# Patient Record
Sex: MALE | Race: WHITE | NOT HISPANIC OR LATINO | Employment: OTHER | URBAN - METROPOLITAN AREA
[De-identification: names, ages, dates, MRNs, and addresses within clinical notes are randomized per-mention and may not be internally consistent; named-entity substitution may affect disease eponyms.]

---

## 2023-04-10 PROBLEM — N40.1 BPH WITH OBSTRUCTION/LOWER URINARY TRACT SYMPTOMS: Status: ACTIVE | Noted: 2023-04-10

## 2023-04-10 PROBLEM — H61.23 BILATERAL IMPACTED CERUMEN: Status: ACTIVE | Noted: 2023-04-10

## 2023-04-10 PROBLEM — B02.29 POSTHERPETIC NEURALGIA: Status: ACTIVE | Noted: 2023-04-10

## 2023-04-10 PROBLEM — I10 BENIGN ESSENTIAL HTN: Status: ACTIVE | Noted: 2023-04-10

## 2023-04-10 PROBLEM — N13.8 BPH WITH OBSTRUCTION/LOWER URINARY TRACT SYMPTOMS: Status: ACTIVE | Noted: 2023-04-10

## 2023-04-10 PROBLEM — R31.29 OTHER MICROSCOPIC HEMATURIA: Status: ACTIVE | Noted: 2023-04-10

## 2023-04-10 PROBLEM — N42.9 PROSTATE DISORDER: Status: ACTIVE | Noted: 2023-04-10

## 2023-04-10 PROBLEM — N28.1 COMPLEX RENAL CYST: Status: ACTIVE | Noted: 2023-04-10

## 2023-04-10 PROBLEM — R10.13 DYSPEPSIA: Status: ACTIVE | Noted: 2023-04-10

## 2023-04-10 RX ORDER — SODIUM FLUORIDE 5 MG/G
GEL, DENTIFRICE DENTAL
COMMUNITY
Start: 2013-12-09

## 2023-04-10 RX ORDER — AMLODIPINE BESYLATE 5 MG/1
1 TABLET ORAL DAILY
COMMUNITY
Start: 2018-02-05 | End: 2023-04-11 | Stop reason: SDUPTHER

## 2023-04-10 RX ORDER — METOPROLOL TARTRATE 25 MG/1
1 TABLET, FILM COATED ORAL DAILY
COMMUNITY
Start: 2013-01-03 | End: 2023-04-11 | Stop reason: SDUPTHER

## 2023-04-10 RX ORDER — FINASTERIDE 5 MG/1
1 TABLET, FILM COATED ORAL DAILY
COMMUNITY
Start: 2013-11-05 | End: 2023-04-11 | Stop reason: SDUPTHER

## 2023-04-10 RX ORDER — TAMSULOSIN HYDROCHLORIDE 0.4 MG/1
1 CAPSULE ORAL DAILY
COMMUNITY
Start: 2018-11-02 | End: 2023-04-11 | Stop reason: SDUPTHER

## 2023-04-11 ENCOUNTER — OFFICE VISIT (OUTPATIENT)
Dept: PRIMARY CARE | Facility: CLINIC | Age: 88
End: 2023-04-11
Payer: MEDICARE

## 2023-04-11 VITALS
WEIGHT: 169 LBS | OXYGEN SATURATION: 96 % | SYSTOLIC BLOOD PRESSURE: 136 MMHG | DIASTOLIC BLOOD PRESSURE: 82 MMHG | HEIGHT: 68 IN | HEART RATE: 79 BPM | BODY MASS INDEX: 25.61 KG/M2

## 2023-04-11 DIAGNOSIS — N13.8 BPH WITH OBSTRUCTION/LOWER URINARY TRACT SYMPTOMS: ICD-10-CM

## 2023-04-11 DIAGNOSIS — I10 BENIGN ESSENTIAL HTN: Primary | ICD-10-CM

## 2023-04-11 DIAGNOSIS — R10.13 DYSPEPSIA: ICD-10-CM

## 2023-04-11 DIAGNOSIS — N40.1 BPH WITH OBSTRUCTION/LOWER URINARY TRACT SYMPTOMS: ICD-10-CM

## 2023-04-11 DIAGNOSIS — Z12.5 ENCOUNTER FOR PROSTATE CANCER SCREENING: ICD-10-CM

## 2023-04-11 DIAGNOSIS — R73.9 ELEVATED BLOOD SUGAR: ICD-10-CM

## 2023-04-11 DIAGNOSIS — E55.9 VITAMIN D DEFICIENCY: ICD-10-CM

## 2023-04-11 DIAGNOSIS — R94.6 ABNORMAL THYROID EXAM: ICD-10-CM

## 2023-04-11 PROCEDURE — 1160F RVW MEDS BY RX/DR IN RCRD: CPT | Performed by: FAMILY MEDICINE

## 2023-04-11 PROCEDURE — 1036F TOBACCO NON-USER: CPT | Performed by: FAMILY MEDICINE

## 2023-04-11 PROCEDURE — 3079F DIAST BP 80-89 MM HG: CPT | Performed by: FAMILY MEDICINE

## 2023-04-11 PROCEDURE — 1159F MED LIST DOCD IN RCRD: CPT | Performed by: FAMILY MEDICINE

## 2023-04-11 PROCEDURE — 99214 OFFICE O/P EST MOD 30 MIN: CPT | Performed by: FAMILY MEDICINE

## 2023-04-11 PROCEDURE — 3075F SYST BP GE 130 - 139MM HG: CPT | Performed by: FAMILY MEDICINE

## 2023-04-11 RX ORDER — FINASTERIDE 5 MG/1
5 TABLET, FILM COATED ORAL DAILY
Qty: 90 TABLET | Refills: 1 | Status: SHIPPED | OUTPATIENT
Start: 2023-04-11 | End: 2023-10-12 | Stop reason: SDUPTHER

## 2023-04-11 RX ORDER — TAMSULOSIN HYDROCHLORIDE 0.4 MG/1
0.4 CAPSULE ORAL DAILY
Qty: 90 CAPSULE | Refills: 1 | Status: SHIPPED | OUTPATIENT
Start: 2023-04-11 | End: 2023-10-12 | Stop reason: SDUPTHER

## 2023-04-11 RX ORDER — AMLODIPINE BESYLATE 5 MG/1
5 TABLET ORAL DAILY
Qty: 90 TABLET | Refills: 1 | Status: SHIPPED | OUTPATIENT
Start: 2023-04-11 | End: 2023-10-12 | Stop reason: SDUPTHER

## 2023-04-11 RX ORDER — METOPROLOL TARTRATE 25 MG/1
25 TABLET, FILM COATED ORAL DAILY
Qty: 90 TABLET | Refills: 1 | Status: SHIPPED | OUTPATIENT
Start: 2023-04-11 | End: 2023-10-12 | Stop reason: SDUPTHER

## 2023-04-11 ASSESSMENT — PATIENT HEALTH QUESTIONNAIRE - PHQ9
2. FEELING DOWN, DEPRESSED OR HOPELESS: NOT AT ALL
SUM OF ALL RESPONSES TO PHQ9 QUESTIONS 1 AND 2: 0
1. LITTLE INTEREST OR PLEASURE IN DOING THINGS: NOT AT ALL

## 2023-04-11 ASSESSMENT — LIFESTYLE VARIABLES
HOW OFTEN DURING THE LAST YEAR HAVE YOU FOUND THAT YOU WERE NOT ABLE TO STOP DRINKING ONCE YOU HAD STARTED: NEVER
HOW MANY STANDARD DRINKS CONTAINING ALCOHOL DO YOU HAVE ON A TYPICAL DAY: 1 OR 2
AUDIT TOTAL SCORE: 4
HOW OFTEN DURING THE LAST YEAR HAVE YOU BEEN UNABLE TO REMEMBER WHAT HAPPENED THE NIGHT BEFORE BECAUSE YOU HAD BEEN DRINKING: NEVER
SKIP TO QUESTIONS 9-10: 1
HOW OFTEN DURING THE LAST YEAR HAVE YOU NEEDED AN ALCOHOLIC DRINK FIRST THING IN THE MORNING TO GET YOURSELF GOING AFTER A NIGHT OF HEAVY DRINKING: NEVER
HOW OFTEN DURING THE LAST YEAR HAVE YOU HAD A FEELING OF GUILT OR REMORSE AFTER DRINKING: NEVER
HAS A RELATIVE, FRIEND, DOCTOR, OR ANOTHER HEALTH PROFESSIONAL EXPRESSED CONCERN ABOUT YOUR DRINKING OR SUGGESTED YOU CUT DOWN: NO
HOW OFTEN DURING THE LAST YEAR HAVE YOU FAILED TO DO WHAT WAS NORMALLY EXPECTED FROM YOU BECAUSE OF DRINKING: NEVER
HOW OFTEN DO YOU HAVE SIX OR MORE DRINKS ON ONE OCCASION: NEVER
AUDIT-C TOTAL SCORE: 4
HOW OFTEN DO YOU HAVE A DRINK CONTAINING ALCOHOL: 4 OR MORE TIMES A WEEK
HAVE YOU OR SOMEONE ELSE BEEN INJURED AS A RESULT OF YOUR DRINKING: NO

## 2023-04-11 ASSESSMENT — ENCOUNTER SYMPTOMS
LOSS OF SENSATION IN FEET: 0
DEPRESSION: 0
OCCASIONAL FEELINGS OF UNSTEADINESS: 0

## 2023-04-11 NOTE — PROGRESS NOTES
"Subjective   Patient ID: Carlos Alberto Seth is a 88 y.o. male who presents for Follow-up and Hypertension.    HPI   The patient is taking amlodipine and metoprolol for his hypertension and his numbers have been good. He is also taking Tamsulosin and finasteride for his BPH and has no side effects from taking these medications. He states that his dyspepsia has been well- controlled with over- the counter- Pepcid, he takes this as needed. The patient adds that he walks daily as form of exercise.    Review of Systems  Constitutional: No fever or chills  Cardiovascular: no chest pain, no palpitations and no syncope.   Respiratory: no cough, no shortness of breath during exertion and no shortness of breath at rest.   Gastrointestinal: no abdominal pain, no nausea and no vomiting.  Neuro: No Headache, no dizziness    Objective   /82   Pulse 79   Ht 1.727 m (5' 8\")   Wt 76.7 kg (169 lb)   SpO2 96%   BMI 25.70 kg/m²     Physical Exam  Constitutional: Alert and in no acute distress. Well developed, well nourished  Head and Face: Head and face: Normal.    Cardiovascular: Heart rate and rhythm were normal, normal S1 and S2. No peripheral edema.   Pulmonary: No respiratory distress. Clear bilateral breath sounds.  Musculoskeletal: Gait and station: Normal. Muscle strength/tone: Normal.   Skin: Normal skin color and pigmentation, normal skin turgor, and no rash.    Psychiatric: Judgment and insight: Intact. Mood and affect: Normal.    Lab Results   Component Value Date    WBC 5.7 10/03/2022    HGB 14.8 10/03/2022    HCT 46.6 10/03/2022     10/03/2022    CHOL 170 10/03/2022    TRIG 136 10/03/2022    HDL 54.9 10/03/2022    ALT 11 10/03/2022    AST 15 10/03/2022     10/03/2022    K 4.3 10/03/2022     10/03/2022    CREATININE 1.00 10/03/2022    BUN 22 10/03/2022    CO2 27 10/03/2022    TSH 3.83 10/03/2022           Assessment/Plan   Diagnoses and all orders for this visit:  Benign essential HTN  -     " Follow Up In [facility] - PCP; Future  -     CBC; Future  -     Comprehensive Metabolic Panel; Future  -     Lipid Panel; Future  -     Albumin , Urine Random; Future  -     amLODIPine (Norvasc) 5 mg tablet; Take 1 tablet (5 mg) by mouth once daily.  -     metoprolol tartrate (Lopressor) 25 mg tablet; Take 1 tablet (25 mg) by mouth once daily.  BPH with obstruction/lower urinary tract symptoms  -     finasteride (Proscar) 5 mg tablet; Take 1 tablet (5 mg) by mouth once daily.  -     tamsulosin (Flomax) 0.4 mg 24 hr capsule; Take 1 capsule (0.4 mg) by mouth once daily.  Dyspepsia  Encounter for prostate cancer screening  -     Prostate Specific Antigen, Screen; Future  Vitamin D deficiency  -     Vitamin B12; Future  -     Vitamin D, Total; Future  Elevated blood sugar  -     Hemoglobin A1C; Future  Abnormal thyroid exam  -     TSH with reflex to Free T4 if abnormal; Future        Dear Carlos Alberto Seth     It was my pleasure to take care of you today in the office. Below are the things we discussed today:    1. Hypertension: Continue amlodipine and metoprolol.    2. BPH: Continue tamsulosin and finasteride.     3. Dyspepsia: Continue OTC Pepcid as needed.    Your yearly Physical is due in: October 2023  When you call the office for your yearly Physical, please ask them to inform me to order your blood work, so that you can get the fasting blood work before your appointment and we can discuss the results at your physical.      Please call me if any questions arise from now until your next visit. I will call you after I am done seeing patients. A Doctor is always available by phone when the office is closed. Please feel free to call for help with any problem that you feel shouldn't wait until the office re-opens.     Scribe Attestation  By signing my name below, INunu Scribe   attest that this documentation has been prepared under the direction and in the presence of Rhea Fairbanks MD.

## 2023-08-10 DIAGNOSIS — I10 BENIGN ESSENTIAL HTN: ICD-10-CM

## 2023-08-11 RX ORDER — METOPROLOL TARTRATE 25 MG/1
25 TABLET, FILM COATED ORAL DAILY
Qty: 90 TABLET | Refills: 1 | OUTPATIENT
Start: 2023-08-11

## 2023-10-09 ENCOUNTER — LAB (OUTPATIENT)
Dept: LAB | Facility: LAB | Age: 88
End: 2023-10-09
Payer: MEDICARE

## 2023-10-09 DIAGNOSIS — E55.9 VITAMIN D DEFICIENCY: ICD-10-CM

## 2023-10-09 DIAGNOSIS — R94.6 ABNORMAL THYROID EXAM: ICD-10-CM

## 2023-10-09 DIAGNOSIS — R73.9 ELEVATED BLOOD SUGAR: ICD-10-CM

## 2023-10-09 DIAGNOSIS — Z12.5 ENCOUNTER FOR PROSTATE CANCER SCREENING: ICD-10-CM

## 2023-10-09 DIAGNOSIS — I10 BENIGN ESSENTIAL HTN: ICD-10-CM

## 2023-10-09 LAB
25(OH)D3 SERPL-MCNC: 27 NG/ML (ref 30–100)
ALBUMIN SERPL BCP-MCNC: 3.8 G/DL (ref 3.4–5)
ALP SERPL-CCNC: 107 U/L (ref 33–136)
ALT SERPL W P-5'-P-CCNC: 8 U/L (ref 10–52)
ANION GAP SERPL CALC-SCNC: 14 MMOL/L (ref 10–20)
AST SERPL W P-5'-P-CCNC: 14 U/L (ref 9–39)
BILIRUB SERPL-MCNC: 0.5 MG/DL (ref 0–1.2)
BUN SERPL-MCNC: 23 MG/DL (ref 6–23)
CALCIUM SERPL-MCNC: 9.3 MG/DL (ref 8.6–10.6)
CHLORIDE SERPL-SCNC: 107 MMOL/L (ref 98–107)
CHOLEST SERPL-MCNC: 169 MG/DL (ref 0–199)
CHOLESTEROL/HDL RATIO: 3.1
CO2 SERPL-SCNC: 24 MMOL/L (ref 21–32)
CREAT SERPL-MCNC: 1.12 MG/DL (ref 0.5–1.3)
CREAT UR-MCNC: 147 MG/DL (ref 20–370)
ERYTHROCYTE [DISTWIDTH] IN BLOOD BY AUTOMATED COUNT: 13.9 % (ref 11.5–14.5)
EST. AVERAGE GLUCOSE BLD GHB EST-MCNC: 105 MG/DL
GFR SERPL CREATININE-BSD FRML MDRD: 63 ML/MIN/1.73M*2
GLUCOSE SERPL-MCNC: 78 MG/DL (ref 74–99)
HBA1C MFR BLD: 5.3 %
HCT VFR BLD AUTO: 48.4 % (ref 41–52)
HDLC SERPL-MCNC: 54.8 MG/DL
HGB BLD-MCNC: 15.6 G/DL (ref 13.5–17.5)
LDLC SERPL CALC-MCNC: 89 MG/DL (ref 140–190)
MCH RBC QN AUTO: 28.6 PG (ref 26–34)
MCHC RBC AUTO-ENTMCNC: 32.2 G/DL (ref 32–36)
MCV RBC AUTO: 89 FL (ref 80–100)
MICROALBUMIN UR-MCNC: 46.8 MG/L
MICROALBUMIN/CREAT UR: 31.8 UG/MG CREAT
NON HDL CHOLESTEROL: 114 MG/DL (ref 0–149)
NRBC BLD-RTO: 0 /100 WBCS (ref 0–0)
PLATELET # BLD AUTO: 236 X10*3/UL (ref 150–450)
PMV BLD AUTO: 11.5 FL (ref 7.5–11.5)
POTASSIUM SERPL-SCNC: 4.4 MMOL/L (ref 3.5–5.3)
PROT SERPL-MCNC: 6.4 G/DL (ref 6.4–8.2)
PSA SERPL-MCNC: 1.27 NG/ML
RBC # BLD AUTO: 5.45 X10*6/UL (ref 4.5–5.9)
SODIUM SERPL-SCNC: 141 MMOL/L (ref 136–145)
TRIGL SERPL-MCNC: 124 MG/DL (ref 0–149)
TSH SERPL-ACNC: 2.95 MIU/L (ref 0.44–3.98)
VIT B12 SERPL-MCNC: 304 PG/ML (ref 211–911)
VLDL: 25 MG/DL (ref 0–40)
WBC # BLD AUTO: 9.6 X10*3/UL (ref 4.4–11.3)

## 2023-10-09 PROCEDURE — 84443 ASSAY THYROID STIM HORMONE: CPT

## 2023-10-09 PROCEDURE — G0103 PSA SCREENING: HCPCS

## 2023-10-09 PROCEDURE — 83036 HEMOGLOBIN GLYCOSYLATED A1C: CPT

## 2023-10-09 PROCEDURE — 80061 LIPID PANEL: CPT

## 2023-10-09 PROCEDURE — 82306 VITAMIN D 25 HYDROXY: CPT

## 2023-10-09 PROCEDURE — 82607 VITAMIN B-12: CPT

## 2023-10-09 PROCEDURE — 80053 COMPREHEN METABOLIC PANEL: CPT

## 2023-10-09 PROCEDURE — 82043 UR ALBUMIN QUANTITATIVE: CPT

## 2023-10-09 PROCEDURE — 85027 COMPLETE CBC AUTOMATED: CPT

## 2023-10-09 PROCEDURE — 36415 COLL VENOUS BLD VENIPUNCTURE: CPT

## 2023-10-09 PROCEDURE — 82570 ASSAY OF URINE CREATININE: CPT

## 2023-10-11 ENCOUNTER — APPOINTMENT (OUTPATIENT)
Dept: PRIMARY CARE | Facility: CLINIC | Age: 88
End: 2023-10-11
Payer: MEDICARE

## 2023-10-12 ENCOUNTER — OFFICE VISIT (OUTPATIENT)
Dept: PRIMARY CARE | Facility: CLINIC | Age: 88
End: 2023-10-12
Payer: MEDICARE

## 2023-10-12 VITALS
SYSTOLIC BLOOD PRESSURE: 130 MMHG | BODY MASS INDEX: 25.01 KG/M2 | DIASTOLIC BLOOD PRESSURE: 77 MMHG | HEART RATE: 77 BPM | OXYGEN SATURATION: 95 % | HEIGHT: 68 IN | WEIGHT: 165 LBS

## 2023-10-12 DIAGNOSIS — Z00.00 ROUTINE GENERAL MEDICAL EXAMINATION AT HEALTH CARE FACILITY: ICD-10-CM

## 2023-10-12 DIAGNOSIS — I10 BENIGN ESSENTIAL HTN: ICD-10-CM

## 2023-10-12 DIAGNOSIS — N13.8 BPH WITH OBSTRUCTION/LOWER URINARY TRACT SYMPTOMS: ICD-10-CM

## 2023-10-12 DIAGNOSIS — R10.13 DYSPEPSIA: ICD-10-CM

## 2023-10-12 DIAGNOSIS — Z00.00 MEDICARE ANNUAL WELLNESS VISIT, SUBSEQUENT: Primary | ICD-10-CM

## 2023-10-12 DIAGNOSIS — N40.1 BPH WITH OBSTRUCTION/LOWER URINARY TRACT SYMPTOMS: ICD-10-CM

## 2023-10-12 DIAGNOSIS — Z71.89 ADVANCE CARE PLANNING: ICD-10-CM

## 2023-10-12 PROCEDURE — 3078F DIAST BP <80 MM HG: CPT | Performed by: FAMILY MEDICINE

## 2023-10-12 PROCEDURE — 1159F MED LIST DOCD IN RCRD: CPT | Performed by: FAMILY MEDICINE

## 2023-10-12 PROCEDURE — G0439 PPPS, SUBSEQ VISIT: HCPCS | Performed by: FAMILY MEDICINE

## 2023-10-12 PROCEDURE — 1170F FXNL STATUS ASSESSED: CPT | Performed by: FAMILY MEDICINE

## 2023-10-12 PROCEDURE — 3075F SYST BP GE 130 - 139MM HG: CPT | Performed by: FAMILY MEDICINE

## 2023-10-12 PROCEDURE — 1158F ADVNC CARE PLAN TLK DOCD: CPT | Performed by: FAMILY MEDICINE

## 2023-10-12 PROCEDURE — 99214 OFFICE O/P EST MOD 30 MIN: CPT | Performed by: FAMILY MEDICINE

## 2023-10-12 PROCEDURE — 1036F TOBACCO NON-USER: CPT | Performed by: FAMILY MEDICINE

## 2023-10-12 PROCEDURE — 1160F RVW MEDS BY RX/DR IN RCRD: CPT | Performed by: FAMILY MEDICINE

## 2023-10-12 RX ORDER — FAMOTIDINE 20 MG/1
TABLET, FILM COATED ORAL
COMMUNITY
End: 2024-04-16 | Stop reason: SDUPTHER

## 2023-10-12 RX ORDER — FINASTERIDE 5 MG/1
5 TABLET, FILM COATED ORAL DAILY
Qty: 90 TABLET | Refills: 1 | Status: SHIPPED | OUTPATIENT
Start: 2023-10-12 | End: 2024-04-16 | Stop reason: SDUPTHER

## 2023-10-12 RX ORDER — TAMSULOSIN HYDROCHLORIDE 0.4 MG/1
0.4 CAPSULE ORAL DAILY
Qty: 90 CAPSULE | Refills: 1 | Status: SHIPPED | OUTPATIENT
Start: 2023-10-12 | End: 2024-04-16 | Stop reason: SDUPTHER

## 2023-10-12 RX ORDER — METOPROLOL TARTRATE 25 MG/1
25 TABLET, FILM COATED ORAL DAILY
Qty: 90 TABLET | Refills: 1 | Status: SHIPPED | OUTPATIENT
Start: 2023-10-12 | End: 2024-04-16 | Stop reason: SDUPTHER

## 2023-10-12 RX ORDER — AMLODIPINE BESYLATE 5 MG/1
5 TABLET ORAL DAILY
Qty: 90 TABLET | Refills: 1 | Status: SHIPPED | OUTPATIENT
Start: 2023-10-12 | End: 2024-04-16 | Stop reason: SDUPTHER

## 2023-10-12 ASSESSMENT — LIFESTYLE VARIABLES
HAS A RELATIVE, FRIEND, DOCTOR, OR ANOTHER HEALTH PROFESSIONAL EXPRESSED CONCERN ABOUT YOUR DRINKING OR SUGGESTED YOU CUT DOWN: NO
HOW MANY STANDARD DRINKS CONTAINING ALCOHOL DO YOU HAVE ON A TYPICAL DAY: 1 OR 2
AUDIT TOTAL SCORE: 4
HOW OFTEN DO YOU HAVE A DRINK CONTAINING ALCOHOL: 4 OR MORE TIMES A WEEK
HOW OFTEN DURING THE LAST YEAR HAVE YOU HAD A FEELING OF GUILT OR REMORSE AFTER DRINKING: NEVER
SKIP TO QUESTIONS 9-10: 1
HOW OFTEN DURING THE LAST YEAR HAVE YOU NEEDED AN ALCOHOLIC DRINK FIRST THING IN THE MORNING TO GET YOURSELF GOING AFTER A NIGHT OF HEAVY DRINKING: NEVER
HAVE YOU OR SOMEONE ELSE BEEN INJURED AS A RESULT OF YOUR DRINKING: NO
HOW OFTEN DURING THE LAST YEAR HAVE YOU BEEN UNABLE TO REMEMBER WHAT HAPPENED THE NIGHT BEFORE BECAUSE YOU HAD BEEN DRINKING: NEVER
HOW OFTEN DURING THE LAST YEAR HAVE YOU FAILED TO DO WHAT WAS NORMALLY EXPECTED FROM YOU BECAUSE OF DRINKING: NEVER
AUDIT-C TOTAL SCORE: 4
HOW OFTEN DO YOU HAVE SIX OR MORE DRINKS ON ONE OCCASION: NEVER
HOW OFTEN DURING THE LAST YEAR HAVE YOU FOUND THAT YOU WERE NOT ABLE TO STOP DRINKING ONCE YOU HAD STARTED: NEVER

## 2023-10-12 ASSESSMENT — ENCOUNTER SYMPTOMS
LOSS OF SENSATION IN FEET: 0
OCCASIONAL FEELINGS OF UNSTEADINESS: 0
DEPRESSION: 0

## 2023-10-12 ASSESSMENT — ACTIVITIES OF DAILY LIVING (ADL)
DRESSING: INDEPENDENT
MANAGING_FINANCES: INDEPENDENT
TAKING_MEDICATION: INDEPENDENT
GROCERY_SHOPPING: INDEPENDENT
DOING_HOUSEWORK: INDEPENDENT
BATHING: INDEPENDENT

## 2023-10-12 ASSESSMENT — COLUMBIA-SUICIDE SEVERITY RATING SCALE - C-SSRS
1. IN THE PAST MONTH, HAVE YOU WISHED YOU WERE DEAD OR WISHED YOU COULD GO TO SLEEP AND NOT WAKE UP?: NO
2. HAVE YOU ACTUALLY HAD ANY THOUGHTS OF KILLING YOURSELF?: NO

## 2023-10-12 ASSESSMENT — PATIENT HEALTH QUESTIONNAIRE - PHQ9
SUM OF ALL RESPONSES TO PHQ9 QUESTIONS 1 AND 2: 0
1. LITTLE INTEREST OR PLEASURE IN DOING THINGS: NOT AT ALL
2. FEELING DOWN, DEPRESSED OR HOPELESS: NOT AT ALL

## 2023-10-12 NOTE — PROGRESS NOTES
"Subjective   Patient ID: Carlos Alberto Seth is a 89 y.o. male who presents for Medicare Annual Wellness Visit Subsequent.      HPI   The patient reports that he is taking amlodipine and metoprolol for his hypertension and tamsulosin and finasteride for his urinary tract symptoms. He is taking these medications as prescribed and is tolerating them well. He complain of mild right hip pain which gets worse when he is walking up stairs.    Review of Systems  Constitutional: No fever or chills, No Night Sweats  Eyes: No Blurry Vision or Eye sight problems  ENT: No Nasal Discharge, Hoarseness, sore throat  Cardiovascular: no chest pain, no palpitations and no syncope.   Respiratory: no cough, no shortness of breath during exertion and no shortness of breath at rest.   Gastrointestinal: no abdominal pain, no nausea and no vomiting.   : No issues with urinary stream, burning with urination, no blood in urine or stools  Skin: No Skin rashes or Lesions  Neuro: No Headache, no dizziness or Numbness or tingling  Psych: No Anxiety, depression or sleeping problems  Heme: No Easy bleeding or brusing.   MSK: + right hip pain.    Objective   /77   Pulse 77   Ht 1.727 m (5' 8\")   Wt 74.8 kg (165 lb)   SpO2 95%   BMI 25.09 kg/m²     Physical Exam  Constitutional: Alert and in no acute distress. Well developed, well nourished.   Head and Face: Head and face: Normal.    Eyes: Normal external exam.  Ears, Nose, Mouth, and Throat: External inspection of ears and nose: Normal.  Hearing: Normal.    Cardiovascular: Heart rate and rhythm were normal, normal S1 and S2. Pedal pulses: Normal. No peripheral edema.   Pulmonary: No respiratory distress. Clear bilateral breath sounds.   Musculoskeletal: No joint swelling seen, normal movements of all extremities. Range of motion: Normal.  Muscle strength/tone: Normal.    Skin: Normal skin color and pigmentation, normal skin turgor, and no rash.   Psychiatric: Judgment and insight: Intact. " Mood and affect: Normal.      Lab Results   Component Value Date    WBC 9.6 10/09/2023    HGB 15.6 10/09/2023    HCT 48.4 10/09/2023     10/09/2023    CHOL 169 10/09/2023    TRIG 124 10/09/2023    HDL 54.8 10/09/2023    ALT 8 (L) 10/09/2023    AST 14 10/09/2023     10/09/2023    K 4.4 10/09/2023     10/09/2023    CREATININE 1.12 10/09/2023    BUN 23 10/09/2023    CO2 24 10/09/2023    TSH 2.95 10/09/2023    HGBA1C 5.3 10/09/2023           Assessment/Plan   Diagnoses and all orders for this visit:  Medicare annual wellness visit, subsequent  Benign essential HTN  -     Follow Up In [facility] - PCP  -     metoprolol tartrate (Lopressor) 25 mg tablet; Take 1 tablet (25 mg) by mouth once daily.  -     amLODIPine (Norvasc) 5 mg tablet; Take 1 tablet (5 mg) by mouth once daily.  -     Follow Up In [facility] - PCP - Established; Future  Advance care planning  -     DNR Comfort Measures Only  Dyspepsia  BPH with obstruction/lower urinary tract symptoms  -     tamsulosin (Flomax) 0.4 mg 24 hr capsule; Take 1 capsule (0.4 mg) by mouth once daily.  -     finasteride (Proscar) 5 mg tablet; Take 1 tablet (5 mg) by mouth once daily.  Routine general medical examination at health care facility        Dear Carlos Alberto Seth     It was my pleasure to take care of you today in the office. Below are the things we discussed today:    1. Immunizations: Yearly Flu shot is recommended. Up-to-date         a: COVID: Please get booster from [facility]         b: Tetanus: Up-to-date         c: Shingrix: Please get from the pharmacy          d: Pneumovax: Up-to-date         e: Prevnar: Up-to-date    2. Blood Work: Reviewed   3. Seen your dentist twice a year  4. Yearly Eye exam is recommended    5. BMI: Normal  6: Diet recommendations:   Eat Clean, Try to have as many home cooked meals as possible  Avoid processed foods which contain excess calories, sugar, and sodium.    7. Exercise recommendations:   150  minutes a week to maintain your weight     If you have to loose weight, you need a better diet and exercise plan.     8. Please get your Living will / Advance directive completed if you do not have one already. Please make sure our office has a copy of the latest one.     9. Colonoscopy: Not indicated   10. PSA: Up-to-date     11. Hypertension: Continue amlodipine and metoprolol.    12. Code status: The patient would like to be a DNR with comfort measure only.    13. Vitamin D deficiency: Take over-the-counter 2000 Ius Vitamin D supplements daily.     14. Urinary tract symptoms: Continue tamsulosin and finasteride.     15. Right hip pain: Advised the patient to take over-the-counter Tylenol as needed for pain and if it worsens please follow up with me.     Follow up in 6 months.    Follow up in one year for a Physical. Please call the office before your Physical to see if you need blood work completed prior to your physical.     Please call me if any questions arise from now until your next visit. I will call you after I am done seeing patients. A Doctor is always available by phone when the office is closed. Please feel free to call for help with any problem that you feel shouldn't wait until the office re-opens.     Scribe Attestation  By signing my name below, INunu Scribe   attest that this documentation has been prepared under the direction and in the presence of Rhea Fairbanks MD.

## 2023-10-12 NOTE — ACP (ADVANCE CARE PLANNING)
Confirming Previous Code Status:   Advance Care Planning Note     Discussion Date: 10/12/23   Discussion Participants: patient    The patient wishes to discuss Advance Care Planning today and the following is a brief summary of our discussion.     Patient has capacity to make their own medical decisions: Yes  Health Care Agent/Surrogate Decision Maker documented in chart: Yes    Documents on file and valid:  Advance Directive/Living Will: Yes   Health Care Power of : Yes  Other: DNR    Communication of Medical Status/Prognosis:   Yes     Communication of Treatment Goals/Options:   yes     Treatment Decisions  Goals of Care: quality of life is prioritized; willing to accept low-burden treatments to achieve meaningful goals     Follow Up Plan  None  Team Members  Rhea Fairbanks MD    Time Statement: Total face to face time spent on advance care planning was 10 minutes with 8 minutes spent in counseling, including the explanation.    Rhea Fairbanks MD  10/12/2023 9:45 AM

## 2023-11-02 ENCOUNTER — HOSPITAL ENCOUNTER (OUTPATIENT)
Dept: RADIOLOGY | Facility: HOSPITAL | Age: 88
Discharge: HOME | End: 2023-11-02
Payer: MEDICARE

## 2023-11-02 DIAGNOSIS — N40.1 BENIGN PROSTATIC HYPERPLASIA WITH LOWER URINARY TRACT SYMPTOMS: ICD-10-CM

## 2023-11-02 DIAGNOSIS — N13.8 OTHER OBSTRUCTIVE AND REFLUX UROPATHY: ICD-10-CM

## 2023-11-02 PROCEDURE — 76770 US EXAM ABDO BACK WALL COMP: CPT

## 2023-11-02 PROCEDURE — 76770 US EXAM ABDO BACK WALL COMP: CPT | Performed by: RADIOLOGY

## 2023-11-07 ENCOUNTER — OFFICE VISIT (OUTPATIENT)
Dept: UROLOGY | Facility: CLINIC | Age: 88
End: 2023-11-07
Payer: MEDICARE

## 2023-11-07 VITALS — HEIGHT: 68 IN | WEIGHT: 165 LBS | BODY MASS INDEX: 25.01 KG/M2 | TEMPERATURE: 96.8 F

## 2023-11-07 DIAGNOSIS — N40.0 BENIGN PROSTATIC HYPERPLASIA, UNSPECIFIED WHETHER LOWER URINARY TRACT SYMPTOMS PRESENT: ICD-10-CM

## 2023-11-07 DIAGNOSIS — N28.1 COMPLEX RENAL CYST: Primary | ICD-10-CM

## 2023-11-07 PROCEDURE — 1036F TOBACCO NON-USER: CPT | Performed by: NURSE PRACTITIONER

## 2023-11-07 PROCEDURE — 1159F MED LIST DOCD IN RCRD: CPT | Performed by: NURSE PRACTITIONER

## 2023-11-07 PROCEDURE — 1158F ADVNC CARE PLAN TLK DOCD: CPT | Performed by: NURSE PRACTITIONER

## 2023-11-07 PROCEDURE — 1126F AMNT PAIN NOTED NONE PRSNT: CPT | Performed by: NURSE PRACTITIONER

## 2023-11-07 PROCEDURE — 3075F SYST BP GE 130 - 139MM HG: CPT | Performed by: NURSE PRACTITIONER

## 2023-11-07 PROCEDURE — 1160F RVW MEDS BY RX/DR IN RCRD: CPT | Performed by: NURSE PRACTITIONER

## 2023-11-07 PROCEDURE — 99214 OFFICE O/P EST MOD 30 MIN: CPT | Performed by: NURSE PRACTITIONER

## 2023-11-07 PROCEDURE — 3078F DIAST BP <80 MM HG: CPT | Performed by: NURSE PRACTITIONER

## 2023-11-07 ASSESSMENT — COLUMBIA-SUICIDE SEVERITY RATING SCALE - C-SSRS
6. HAVE YOU EVER DONE ANYTHING, STARTED TO DO ANYTHING, OR PREPARED TO DO ANYTHING TO END YOUR LIFE?: NO
2. HAVE YOU ACTUALLY HAD ANY THOUGHTS OF KILLING YOURSELF?: NO
1. IN THE PAST MONTH, HAVE YOU WISHED YOU WERE DEAD OR WISHED YOU COULD GO TO SLEEP AND NOT WAKE UP?: NO

## 2023-11-07 ASSESSMENT — PATIENT HEALTH QUESTIONNAIRE - PHQ9
2. FEELING DOWN, DEPRESSED OR HOPELESS: NOT AT ALL
1. LITTLE INTEREST OR PLEASURE IN DOING THINGS: NOT AT ALL
SUM OF ALL RESPONSES TO PHQ9 QUESTIONS 1 AND 2: 0

## 2023-11-07 ASSESSMENT — ENCOUNTER SYMPTOMS
OCCASIONAL FEELINGS OF UNSTEADINESS: 0
DEPRESSION: 0
LOSS OF SENSATION IN FEET: 0

## 2023-11-07 ASSESSMENT — PAIN SCALES - GENERAL: PAINLEVEL: 0-NO PAIN

## 2023-11-07 NOTE — PROGRESS NOTES
[facility]  Outpatient Clinic Note      Patient: Carlos Alberto Seth  Age/Sex: 89 y.o., male  MRN: [id number]      History of Present Illness  89 year old male presents for US results. He has a history of BPH and complex renal cyst. He has no new complaints today. He reports he is voiding well and has no bothersome urinary symptoms. He continues to take tamsulosin and finasteride, refilled by PCP. He denies any dysuria, gross hematuria, flank pain, fevers or chills.     Past Medical & Surgical History  Past Medical History:   Diagnosis Date    Cyst of kidney, acquired     Cyst, kidney, acquired    Cyst of kidney, acquired 02/28/2018    Cyst, kidney, acquired    Disorder of the skin and subcutaneous tissue, unspecified 03/27/2019    Skin lesion    Disorder of the skin and subcutaneous tissue, unspecified 04/02/2019    Skin lesion    Impacted cerumen, right ear 06/06/2019    Impacted cerumen of right ear    Other conditions influencing health status     Reported Prostate Antigen Blood Test    Other conditions influencing health status     Colonoscopy (Fiberoptic)    Other specified diseases of anus and rectum     Rectal mass    Personal history of other diseases of male genital organs     History of benign prostatic hypertrophy    Personal history of other diseases of the circulatory system     History of hypertension    Personal history of other endocrine, nutritional and metabolic disease 01/11/2017    History of hyperlipidemia    Personal history of other infectious and parasitic diseases 03/23/2020    History of herpes zoster    Personal history of other specified conditions 04/12/2022    History of elevated prostate specific antigen (PSA)      Past Surgical History:   Procedure Laterality Date    HERNIA REPAIR  06/11/2013    Hernia Repair    OTHER SURGICAL HISTORY  12/18/2013    Needle Biopsy Of Prostate    OTHER SURGICAL HISTORY  12/18/2013    Rectal Surgery    TONSILLECTOMY  06/11/2013    Tonsillectomy           Labs  Prostate Specific Antigen, Screen         Component  Ref Range & Units 4 wk ago 1 yr ago 2 yr ago 3 yr ago   Prostate Specific Antigen,Screen  <=4.00 ng/mL 1.27 1.67 R, CM 1.27 R, CM 1.41 R,           Medications:  Current Outpatient Medications on File Prior to Visit   Medication Sig Dispense Refill    amLODIPine (Norvasc) 5 mg tablet Take 1 tablet (5 mg) by mouth once daily. 90 tablet 1    famotidine (Pepcid) 20 mg tablet Take by mouth.      finasteride (Proscar) 5 mg tablet Take 1 tablet (5 mg) by mouth once daily. 90 tablet 1    fluoride, sodium, 1.1 % gel Apply to teeth.      metoprolol tartrate (Lopressor) 25 mg tablet Take 1 tablet (25 mg) by mouth once daily. 90 tablet 1    tamsulosin (Flomax) 0.4 mg 24 hr capsule Take 1 capsule (0.4 mg) by mouth once daily. 90 capsule 1     No current facility-administered medications on file prior to visit.          Physical Exam                                                                                                                      General: Well developed, well nourished, alert and cooperative, appears in no acute distress  Eyes: Non-injected conjunctiva, sclera clear, no proptosis  Cardiac: Extremities are warm and well perfused. No edema, cyanosis or pallor.   Lungs: Breathing is easy, non-labored. Speaking in clear and complete sentences. Normal diaphragmatic movement.  MSK: Ambulatory with steady gait, unassisted  Neuro: alert and oriented to person, place and time  Psych: Demonstrates good judgement and reason, without hallucinations, abnormal affect or abnormal behaviors.  Skin: no obvious lesions, no rashes      Review of Systems  Review of Systems   All other systems reviewed and are negative.         Imaging  RBUS 11/02/2023  IMPRESSION:  1. Interval increase in size of the complex, multi-septated cystic  lesion within the interpolar region of the left kidney when compared  to most recent prior renal ultrasound and MRI (10/25/2022) which  may  be due to differences in measuring technique. If there is persistent  clinical concern, a repeat dedicated MRI renal protocol could be  obtained for further characterization.  2. Interval increase in size of the previously noted lobulated cyst  within the superior pole of the left kidney which now contains  layering internal debris which may represent blood products.  3. Redemonstration of prostatomegaly.      Assessment & Plan  89 year old male presents for US results. He has a history of BPH and complex renal cyst. He has no new complaints today. He reports he is voiding well and has no bothersome urinary symptoms. He continues to take tamsulosin and finasteride, refilled by PCP. He denies any dysuria, gross hematuria, flank pain, fevers or chills.     MRI kidney with and without contrast was obtained on October 25, 2022 which demonstrates stable to mildly decreased multi-septated cystic lesions in the left kidney which are stable when compared to scans dating back to 2018. Follow-up renal ultrasound is stable. Patient not interested in repeat MRI. PSA WNL.      Continue with current medications. Follow-up 1 year, or sooner if needed, with ultrasound prior.       Reviewed and approved by LAZ POSADAS on 11/7/23 at 8:51 AM.

## 2024-01-04 ENCOUNTER — APPOINTMENT (OUTPATIENT)
Dept: DERMATOLOGY | Facility: CLINIC | Age: 89
End: 2024-01-04
Payer: MEDICARE

## 2024-01-31 ENCOUNTER — OFFICE VISIT (OUTPATIENT)
Dept: DERMATOLOGY | Facility: CLINIC | Age: 89
End: 2024-01-31
Payer: MEDICARE

## 2024-01-31 DIAGNOSIS — D48.5 NEOPLASM OF UNCERTAIN BEHAVIOR OF SKIN: Primary | ICD-10-CM

## 2024-01-31 DIAGNOSIS — D22.9 MULTIPLE BENIGN NEVI: ICD-10-CM

## 2024-01-31 DIAGNOSIS — L57.0 ACTINIC KERATOSIS: ICD-10-CM

## 2024-01-31 PROCEDURE — 1126F AMNT PAIN NOTED NONE PRSNT: CPT | Performed by: STUDENT IN AN ORGANIZED HEALTH CARE EDUCATION/TRAINING PROGRAM

## 2024-01-31 PROCEDURE — 1036F TOBACCO NON-USER: CPT | Performed by: STUDENT IN AN ORGANIZED HEALTH CARE EDUCATION/TRAINING PROGRAM

## 2024-01-31 PROCEDURE — 88305 TISSUE EXAM BY PATHOLOGIST: CPT | Performed by: DERMATOLOGY

## 2024-01-31 PROCEDURE — 11102 TANGNTL BX SKIN SINGLE LES: CPT | Performed by: STUDENT IN AN ORGANIZED HEALTH CARE EDUCATION/TRAINING PROGRAM

## 2024-01-31 PROCEDURE — 17003 DESTRUCT PREMALG LES 2-14: CPT | Performed by: STUDENT IN AN ORGANIZED HEALTH CARE EDUCATION/TRAINING PROGRAM

## 2024-01-31 PROCEDURE — 17000 DESTRUCT PREMALG LESION: CPT | Performed by: STUDENT IN AN ORGANIZED HEALTH CARE EDUCATION/TRAINING PROGRAM

## 2024-01-31 PROCEDURE — 1123F ACP DISCUSS/DSCN MKR DOCD: CPT | Performed by: STUDENT IN AN ORGANIZED HEALTH CARE EDUCATION/TRAINING PROGRAM

## 2024-01-31 PROCEDURE — 99213 OFFICE O/P EST LOW 20 MIN: CPT | Performed by: STUDENT IN AN ORGANIZED HEALTH CARE EDUCATION/TRAINING PROGRAM

## 2024-01-31 PROCEDURE — 1159F MED LIST DOCD IN RCRD: CPT | Performed by: STUDENT IN AN ORGANIZED HEALTH CARE EDUCATION/TRAINING PROGRAM

## 2024-01-31 NOTE — PROGRESS NOTES
Subjective     Carlos Alberto Seth is a 89 y.o. male who presents for the following: Skin Check (Pt has Hx of NMSC.).     Review of Systems:  No other skin or systemic complaints other than what is documented elsewhere in the note.    The following portions of the chart were reviewed this encounter and updated as appropriate:          Skin Cancer History  No skin cancer on file.      Specialty Problems    None       Objective   Well appearing patient in no apparent distress; mood and affect are within normal limits.    A focused skin examination was performed. All findings within normal limits unless otherwise noted below.    Assessment/Plan   1. Neoplasm of uncertain behavior of skin  Right Lower Leg - Anterior  Pinkish 5mm macule              Lesion biopsy  Type of biopsy: tangential    Informed consent: discussed and consent obtained    Timeout: patient name, date of birth, surgical site, and procedure verified    Procedure prep:  Patient was prepped and draped  Anesthesia: the lesion was anesthetized in a standard fashion    Anesthetic:  1% lidocaine w/ epinephrine 1-100,000 local infiltration  Instrument used: DermaBlade    Hemostasis achieved with: aluminum chloride    Outcome: patient tolerated procedure well    Post-procedure details: sterile dressing applied and wound care instructions given    Dressing type: petrolatum and bandage      Specimen 1 - Dermatopathology- DERM LAB  Differential Diagnosis: sccis?ISK?  Check Margins Yes/No?:    Comments:    Dermpath Lab: Routine Histopathology (formalin-fixed tissue)    2. Actinic keratosis (3)  Mid Parietal Scalp (3)  Erythematous macules with gritty scale.    Destr of lesion - Mid Parietal Scalp (3)  Complexity: simple    Destruction method: cryotherapy    Informed consent: discussed and consent obtained    Lesion destroyed using liquid nitrogen: Yes    Cryotherapy cycles:  1  Outcome: patient tolerated procedure well with no complications    Post-procedure  details: wound care instructions given      3. Multiple benign nevi    Exam findings: Benign appearing macules and papules  Plan: monitor for any new or changing nevi. Notify me should this occur.  Over the counter use of sun screen product (30+ SPF with mineral sun screen) recommended

## 2024-02-02 LAB
LABORATORY COMMENT REPORT: NORMAL
PATH REPORT.FINAL DX SPEC: NORMAL
PATH REPORT.GROSS SPEC: NORMAL
PATH REPORT.MICROSCOPIC SPEC OTHER STN: NORMAL
PATH REPORT.RELEVANT HX SPEC: NORMAL
PATH REPORT.TOTAL CANCER: NORMAL

## 2024-04-16 ENCOUNTER — OFFICE VISIT (OUTPATIENT)
Dept: PRIMARY CARE | Facility: CLINIC | Age: 89
End: 2024-04-16
Payer: MEDICARE

## 2024-04-16 VITALS
HEIGHT: 68 IN | WEIGHT: 162 LBS | HEART RATE: 75 BPM | OXYGEN SATURATION: 94 % | DIASTOLIC BLOOD PRESSURE: 70 MMHG | SYSTOLIC BLOOD PRESSURE: 128 MMHG | BODY MASS INDEX: 24.55 KG/M2

## 2024-04-16 DIAGNOSIS — N40.1 BPH WITH OBSTRUCTION/LOWER URINARY TRACT SYMPTOMS: ICD-10-CM

## 2024-04-16 DIAGNOSIS — Z12.5 ENCOUNTER FOR PROSTATE CANCER SCREENING: ICD-10-CM

## 2024-04-16 DIAGNOSIS — R73.9 ELEVATED BLOOD SUGAR: ICD-10-CM

## 2024-04-16 DIAGNOSIS — I10 BENIGN ESSENTIAL HTN: Primary | ICD-10-CM

## 2024-04-16 DIAGNOSIS — E55.9 VITAMIN D DEFICIENCY: ICD-10-CM

## 2024-04-16 DIAGNOSIS — R94.6 ABNORMAL THYROID EXAM: ICD-10-CM

## 2024-04-16 DIAGNOSIS — R10.13 DYSPEPSIA: ICD-10-CM

## 2024-04-16 DIAGNOSIS — N13.8 BPH WITH OBSTRUCTION/LOWER URINARY TRACT SYMPTOMS: ICD-10-CM

## 2024-04-16 PROCEDURE — 1036F TOBACCO NON-USER: CPT | Performed by: FAMILY MEDICINE

## 2024-04-16 PROCEDURE — G2211 COMPLEX E/M VISIT ADD ON: HCPCS | Performed by: FAMILY MEDICINE

## 2024-04-16 PROCEDURE — 3078F DIAST BP <80 MM HG: CPT | Performed by: FAMILY MEDICINE

## 2024-04-16 PROCEDURE — 3074F SYST BP LT 130 MM HG: CPT | Performed by: FAMILY MEDICINE

## 2024-04-16 PROCEDURE — 1159F MED LIST DOCD IN RCRD: CPT | Performed by: FAMILY MEDICINE

## 2024-04-16 PROCEDURE — 99214 OFFICE O/P EST MOD 30 MIN: CPT | Performed by: FAMILY MEDICINE

## 2024-04-16 PROCEDURE — 1160F RVW MEDS BY RX/DR IN RCRD: CPT | Performed by: FAMILY MEDICINE

## 2024-04-16 PROCEDURE — 1123F ACP DISCUSS/DSCN MKR DOCD: CPT | Performed by: FAMILY MEDICINE

## 2024-04-16 RX ORDER — AMLODIPINE BESYLATE 5 MG/1
5 TABLET ORAL DAILY
Qty: 90 TABLET | Refills: 1 | Status: SHIPPED | OUTPATIENT
Start: 2024-04-16

## 2024-04-16 RX ORDER — CHOLECALCIFEROL (VITAMIN D3) 50 MCG
50 TABLET ORAL DAILY
COMMUNITY

## 2024-04-16 RX ORDER — FAMOTIDINE 20 MG/1
20 TABLET, FILM COATED ORAL 2 TIMES DAILY
Qty: 180 TABLET | Refills: 1 | Status: SHIPPED | OUTPATIENT
Start: 2024-04-16

## 2024-04-16 RX ORDER — FINASTERIDE 5 MG/1
5 TABLET, FILM COATED ORAL DAILY
Qty: 90 TABLET | Refills: 1 | Status: SHIPPED | OUTPATIENT
Start: 2024-04-16

## 2024-04-16 RX ORDER — TAMSULOSIN HYDROCHLORIDE 0.4 MG/1
0.4 CAPSULE ORAL DAILY
Qty: 90 CAPSULE | Refills: 1 | Status: SHIPPED | OUTPATIENT
Start: 2024-04-16

## 2024-04-16 RX ORDER — METOPROLOL TARTRATE 25 MG/1
25 TABLET, FILM COATED ORAL DAILY
Qty: 90 TABLET | Refills: 1 | Status: SHIPPED | OUTPATIENT
Start: 2024-04-16

## 2024-04-16 NOTE — PROGRESS NOTES
"Subjective   Patient ID: Carlos Alberto Seth is a 89 y.o. male who presents for Follow-up (6 Month).    HPI   The patient reports that he is taking amlodipine and metoprolol for his hypertension, tamsulosin and finasteride for BPH and Pepcid for GERD. He is taking these medications and denies having any side effects from them. His blood pressure is well- controlled at this time.    Review of Systems  Constitutional: No fever or chills  Cardiovascular: no chest pain, no palpitations and no syncope.   Respiratory: no cough, no shortness of breath during exertion and no shortness of breath at rest.   Gastrointestinal: no abdominal pain, no nausea and no vomiting.  Neuro: No Headache, no dizziness    Objective   /82   Pulse 75   Ht 1.727 m (5' 8\")   Wt 73.5 kg (162 lb)   SpO2 94%   BMI 24.63 kg/m²     Physical Exam  Constitutional: Alert and in no acute distress. Well developed, well nourished  Head and Face: Head and face: Normal.    Cardiovascular: Heart rate and rhythm were normal, normal S1 and S2. No peripheral edema.   Pulmonary: No respiratory distress. Clear bilateral breath sounds.  Musculoskeletal: Gait and station: Normal. Muscle strength/tone: Normal.   Skin: Normal skin color and pigmentation, normal skin turgor, and no rash.    Psychiatric: Judgment and insight: Intact. Mood and affect: Normal.    Lab Results   Component Value Date    WBC 9.6 10/09/2023    HGB 15.6 10/09/2023    HCT 48.4 10/09/2023     10/09/2023    CHOL 169 10/09/2023    TRIG 124 10/09/2023    HDL 54.8 10/09/2023    ALT 8 (L) 10/09/2023    AST 14 10/09/2023     10/09/2023    K 4.4 10/09/2023     10/09/2023    CREATININE 1.12 10/09/2023    BUN 23 10/09/2023    CO2 24 10/09/2023    TSH 2.95 10/09/2023    HGBA1C 5.3 10/09/2023       US renal complete  Narrative: Interpreted By:  Armond Graham and Liller Gregory   STUDY:  US RENAL COMPLETE;  11/2/2023 9:54 am      INDICATION:  89-year-old male with obstructive " uropathy.      COMPARISON:  Renal ultrasound 10/04/2021 and 09/21/2022 MRI kidneys 09/19/2018 and  09/24/2018      ACCESSION NUMBER(S):  [id number]      ORDERING CLINICIAN:  JASE JHAVERI      TECHNIQUE:  Multiple images of the kidneys were obtained.      FINDINGS:  RIGHT KIDNEY:  The right kidney measures 10.8 cm in length. The renal cortical  echogenicity and thickness are within normal limits. No  hydronephrosis is present; no evidence of nephrolithiasis. There is  redemonstration of multiple simple peripelvic cysts within the lower  pole and interpolar region measuring up to 2.6 x 0.9 x 1.2 cm and 2.1  x 1.4 cm respectively.      LEFT KIDNEY:  The left kidney measures 12.6 cm in length. The renal cortical  echogenicity and thickness are within normal limits. No  hydronephrosis is present; no evidence of nephrolithiasis.      There is redemonstration of multi septated cysts within the left  kidney measuring up to 8.8 x 5.9 x 8.3 cm which appears to have  intervally enlarged when compared to prior ultrasound from 10/04/2021  where it measured up to 5.7 x 6.8 x 6.6 cm.      There is redemonstration of complex appearing cyst with multiple  internal echoes and layering echogenic material within its dependent  portion measuring up to 5.1 x 3 x 3.1 cm within the superior pole.  This also appears to have intervally increased in size when compared  to prior examination where it measured up to 3.0 x 2.9 x 3.3 cm.      Simple appearing cyst seen within the inferior pole of the left  kidney is again seen measuring up to 3.5 x 2.7 x 3.2 cm which  previously measured up to 3.0 x 2.4 x 2.7 cm.      BLADDER:  Prominent median lobe of the prostate gland indents the posterior  aspect of the bladder causing mass effect. Otherwise, the urinary  bladder is unremarkable in appearance.      Prostate is enlarged measuring up to 5.7 x 4.2 x 4.6 cm and  previously measured 5.2 x 5.2 x 5.3 cm.      Impression: 1. Interval increase  in size of the complex, multi-septated cystic  lesion within the interpolar region of the left kidney when compared  to most recent prior renal ultrasound and MRI (10/25/2022) which may  be due to differences in measuring technique. If there is persistent  clinical concern, a repeat dedicated MRI renal protocol could be  obtained for further characterization.  2. Interval increase in size of the previously noted lobulated cyst  within the superior pole of the left kidney which now contains  layering internal debris which may represent blood products.  3. Redemonstration of prostatomegaly.      I personally reviewed the images/study and I agree with the findings  as stated above by resident physician, Dr. Nain Downing. The  study was interpreted at [facility] in [facility].      MACRO:  None      Signed by: Armond Graham 11/2/2023 11:35 AM  Dictation workstation:   [id number]      Assessment/Plan   Diagnoses and all orders for this visit:  Benign essential HTN  -     Follow Up In [facility] - PCP - Established  -     metoprolol tartrate (Lopressor) 25 mg tablet; Take 1 tablet (25 mg) by mouth once daily.  -     amLODIPine (Norvasc) 5 mg tablet; Take 1 tablet (5 mg) by mouth once daily.  -     Follow Up In [facility] - PCP - Medicare Annual; Future  -     Albumin , Urine Random; Future  -     CBC; Future  -     Comprehensive Metabolic Panel; Future  -     Lipid Panel; Future  BPH with obstruction/lower urinary tract symptoms  -     finasteride (Proscar) 5 mg tablet; Take 1 tablet (5 mg) by mouth once daily.  -     tamsulosin (Flomax) 0.4 mg 24 hr capsule; Take 1 capsule (0.4 mg) by mouth once daily.  Dyspepsia  -     famotidine (Pepcid) 20 mg tablet; Take 1 tablet (20 mg) by mouth 2 times a day.  Elevated blood sugar  -     Hemoglobin A1C; Future  Encounter for prostate cancer screening  -     Prostate Specific Antigen, Screen; Future  Abnormal  thyroid exam  -     TSH with reflex to Free T4 if abnormal; Future  Vitamin D deficiency  -     Vitamin B12; Future  -     Vitamin D 25-Hydroxy,Total (for eval of Vitamin D levels); Future        Dear Carlos Alberto Seth     It was my pleasure to take care of you today in the office. Below are the things we discussed today:    1. Hypertension: Well- controlled. Continue metoprolol and amlodipine.     2. GERD: Continue Pepcid. Take in the AM on an empty stomach.    3. BPH: Continue finasteride and Tasmulosin.    Your yearly Physical is due in: Oct 2024   When you call the office for your yearly Physical, please ask them to inform me to order your blood work, so that you can get the fasting blood work before your appointment and we can discuss the results at your physical.      Please call me if any questions arise from now until your next visit. I will call you after I am done seeing patients. A Doctor is always available by phone when the office is closed. Please feel free to call for help with any problem that you feel shouldn't wait until the office re-opens.     Scribe Attestation  By signing my name below, INunu Scribe   attest that this documentation has been prepared under the direction and in the presence of Rhea Fairbanks MD.

## 2024-07-25 DIAGNOSIS — I10 BENIGN ESSENTIAL HTN: ICD-10-CM

## 2024-07-25 DIAGNOSIS — N40.1 BPH WITH OBSTRUCTION/LOWER URINARY TRACT SYMPTOMS: ICD-10-CM

## 2024-07-25 DIAGNOSIS — N13.8 BPH WITH OBSTRUCTION/LOWER URINARY TRACT SYMPTOMS: ICD-10-CM

## 2024-07-26 RX ORDER — AMLODIPINE BESYLATE 5 MG/1
5 TABLET ORAL DAILY
Qty: 90 TABLET | Refills: 0 | Status: SHIPPED | OUTPATIENT
Start: 2024-07-26

## 2024-07-26 RX ORDER — FINASTERIDE 5 MG/1
5 TABLET, FILM COATED ORAL DAILY
Qty: 90 TABLET | Refills: 0 | Status: SHIPPED | OUTPATIENT
Start: 2024-07-26

## 2024-07-26 RX ORDER — TAMSULOSIN HYDROCHLORIDE 0.4 MG/1
0.4 CAPSULE ORAL DAILY
Qty: 90 CAPSULE | Refills: 0 | Status: SHIPPED | OUTPATIENT
Start: 2024-07-26

## 2024-08-22 DIAGNOSIS — N40.1 BPH WITH OBSTRUCTION/LOWER URINARY TRACT SYMPTOMS: ICD-10-CM

## 2024-08-22 DIAGNOSIS — N13.8 BPH WITH OBSTRUCTION/LOWER URINARY TRACT SYMPTOMS: ICD-10-CM

## 2024-08-22 RX ORDER — FINASTERIDE 5 MG/1
5 TABLET, FILM COATED ORAL DAILY
Qty: 90 TABLET | Refills: 0 | OUTPATIENT
Start: 2024-08-22

## 2024-10-01 ENCOUNTER — OFFICE VISIT (OUTPATIENT)
Dept: UROLOGY | Facility: HOSPITAL | Age: 89
End: 2024-10-01
Payer: MEDICARE

## 2024-10-01 DIAGNOSIS — N28.1 ACQUIRED COMPLEX RENAL CYST: Primary | ICD-10-CM

## 2024-10-01 PROCEDURE — 99204 OFFICE O/P NEW MOD 45 MIN: CPT | Performed by: STUDENT IN AN ORGANIZED HEALTH CARE EDUCATION/TRAINING PROGRAM

## 2024-10-01 PROCEDURE — 1123F ACP DISCUSS/DSCN MKR DOCD: CPT | Performed by: STUDENT IN AN ORGANIZED HEALTH CARE EDUCATION/TRAINING PROGRAM

## 2024-10-01 PROCEDURE — 99214 OFFICE O/P EST MOD 30 MIN: CPT | Performed by: STUDENT IN AN ORGANIZED HEALTH CARE EDUCATION/TRAINING PROGRAM

## 2024-10-01 NOTE — PROGRESS NOTES
Subjective   Patient ID: Carlos Alberto Seth is a 89 y.o. male    HPI  89 y.o. male who  presents for a follow-up regarding a renal mass that has been stable for approximately 30 years. The mass was initially discovered incidentally during scans for suspected prostate cancer, which was never confirmed. Over the years, the patient has undergone various imaging modalities, including CAT scans, MRIs, and ultrasounds, all of which have shown no significant changes in the mass. The patient reports no symptoms such as pain, infection, or urinary issues. He has been followed by multiple urologists, including Dr. Nav Avalos and Dr. Desir. The patient is in good health otherwise and has no significant medical concerns. He is seeking guidance on whether to continue monitoring the mass or to consider further intervention.    The most recent Renal US, conducted on 11/2/2024, revealed:  1. Interval increase in size of the complex, multi-septated cystic  lesion within the interpolar region of the left kidney when compared  to most recent prior renal ultrasound and MRI (10/25/2022) which may  be due to differences in measuring technique. If there is persistent  clinical concern, a repeat dedicated MRI renal protocol could be  obtained for further characterization.  2. Interval increase in size of the previously noted lobulated cyst  within the superior pole of the left kidney which now contains  layering internal debris which may represent blood products.  3. Redemonstration of prostatomegaly.           Review of Systems    All systems were reviewed. Anything negative was noted in the HPI.    Objective   Physical Exam    General: Well developed, well nourished, alert and cooperative, appears in no acute distress   Eyes: Non-injected conjunctiva, sclera clear, no proptosis   Cardiac: Extremities are warm and well perfused. No edema, cyanosis or pallor   Lungs: Breathing is easy, non-labored. Speaking in clear and complete  sentences. Normal diaphragmatic movement   MSK: Ambulatory with steady gait, unassisted   Neuro: Alert and oriented to person, place, and time   Psych: Demonstrates good judgment and reason, without hallucinations, abnormal affect or abnormal behaviors   Skin: No obvious lesions, no rashes       No CVA tenderness bilaterally   No suprapubic pain or discomfort       Past Medical History:   Diagnosis Date    Cyst of kidney, acquired     Cyst, kidney, acquired    Cyst of kidney, acquired 02/28/2018    Cyst, kidney, acquired    Disorder of the skin and subcutaneous tissue, unspecified 03/27/2019    Skin lesion    Disorder of the skin and subcutaneous tissue, unspecified 04/02/2019    Skin lesion    Impacted cerumen, right ear 06/06/2019    Impacted cerumen of right ear    Other conditions influencing health status     Reported Prostate Antigen Blood Test    Other conditions influencing health status     Colonoscopy (Fiberoptic)    Other specified diseases of anus and rectum     Rectal mass    Personal history of other diseases of male genital organs     History of benign prostatic hypertrophy    Personal history of other diseases of the circulatory system     History of hypertension    Personal history of other endocrine, nutritional and metabolic disease 01/11/2017    History of hyperlipidemia    Personal history of other infectious and parasitic diseases 03/23/2020    History of herpes zoster    Personal history of other specified conditions 04/12/2022    History of elevated prostate specific antigen (PSA)         Past Surgical History:   Procedure Laterality Date    HERNIA REPAIR  06/11/2013    Hernia Repair    OTHER SURGICAL HISTORY  12/18/2013    Needle Biopsy Of Prostate    OTHER SURGICAL HISTORY  12/18/2013    Rectal Surgery    TONSILLECTOMY  06/11/2013    Tonsillectomy       Assessment/Plan   Renal mass, stable for 30 years    89 y.o. male who presents for the above condition, We had a very long and extensive  discussion with the patient regarding the pathophysiology, differential diagnosis, risk factor, management, natural history, incidence and diagnostic work-up of the condition.      Plan:  - Renal US  - Follow up in 1 year        10/1/2024    Jagjit Attestation  By signing my name below, Bjorn MARTIN Scribe   attest that this documentation has been prepared under the direction and in the presence of Dr. Jc Vick

## 2024-10-08 ENCOUNTER — LAB (OUTPATIENT)
Dept: LAB | Facility: LAB | Age: 89
End: 2024-10-08
Payer: MEDICARE

## 2024-10-08 ENCOUNTER — HOSPITAL ENCOUNTER (OUTPATIENT)
Dept: RADIOLOGY | Facility: HOSPITAL | Age: 89
Discharge: HOME | End: 2024-10-08
Payer: MEDICARE

## 2024-10-08 DIAGNOSIS — E55.9 VITAMIN D DEFICIENCY: ICD-10-CM

## 2024-10-08 DIAGNOSIS — N28.1 ACQUIRED COMPLEX RENAL CYST: ICD-10-CM

## 2024-10-08 DIAGNOSIS — I10 BENIGN ESSENTIAL HTN: ICD-10-CM

## 2024-10-08 DIAGNOSIS — R73.9 ELEVATED BLOOD SUGAR: ICD-10-CM

## 2024-10-08 DIAGNOSIS — Z12.5 ENCOUNTER FOR PROSTATE CANCER SCREENING: ICD-10-CM

## 2024-10-08 DIAGNOSIS — R94.6 ABNORMAL THYROID EXAM: ICD-10-CM

## 2024-10-08 LAB
25(OH)D3 SERPL-MCNC: 61 NG/ML (ref 30–100)
ALBUMIN SERPL BCP-MCNC: 4.1 G/DL (ref 3.4–5)
ALP SERPL-CCNC: 107 U/L (ref 33–136)
ALT SERPL W P-5'-P-CCNC: 10 U/L (ref 10–52)
ANION GAP SERPL CALC-SCNC: 12 MMOL/L (ref 10–20)
AST SERPL W P-5'-P-CCNC: 16 U/L (ref 9–39)
BILIRUB SERPL-MCNC: 0.5 MG/DL (ref 0–1.2)
BUN SERPL-MCNC: 20 MG/DL (ref 6–23)
CALCIUM SERPL-MCNC: 9.2 MG/DL (ref 8.6–10.6)
CHLORIDE SERPL-SCNC: 107 MMOL/L (ref 98–107)
CHOLEST SERPL-MCNC: 174 MG/DL (ref 0–199)
CHOLESTEROL/HDL RATIO: 3.1
CO2 SERPL-SCNC: 24 MMOL/L (ref 21–32)
CREAT SERPL-MCNC: 1.07 MG/DL (ref 0.5–1.3)
EGFRCR SERPLBLD CKD-EPI 2021: 66 ML/MIN/1.73M*2
ERYTHROCYTE [DISTWIDTH] IN BLOOD BY AUTOMATED COUNT: 13.8 % (ref 11.5–14.5)
EST. AVERAGE GLUCOSE BLD GHB EST-MCNC: 103 MG/DL
GLUCOSE SERPL-MCNC: 114 MG/DL (ref 74–99)
HBA1C MFR BLD: 5.2 %
HCT VFR BLD AUTO: 48.2 % (ref 41–52)
HDLC SERPL-MCNC: 56.1 MG/DL
HGB BLD-MCNC: 15.4 G/DL (ref 13.5–17.5)
LDLC SERPL CALC-MCNC: 90 MG/DL
MCH RBC QN AUTO: 28.9 PG (ref 26–34)
MCHC RBC AUTO-ENTMCNC: 32 G/DL (ref 32–36)
MCV RBC AUTO: 90 FL (ref 80–100)
NON HDL CHOLESTEROL: 118 MG/DL (ref 0–149)
NRBC BLD-RTO: 0 /100 WBCS (ref 0–0)
PLATELET # BLD AUTO: 207 X10*3/UL (ref 150–450)
POTASSIUM SERPL-SCNC: 4.6 MMOL/L (ref 3.5–5.3)
PROT SERPL-MCNC: 6.5 G/DL (ref 6.4–8.2)
PSA SERPL-MCNC: 1.31 NG/ML
RBC # BLD AUTO: 5.33 X10*6/UL (ref 4.5–5.9)
SODIUM SERPL-SCNC: 138 MMOL/L (ref 136–145)
TRIGL SERPL-MCNC: 140 MG/DL (ref 0–149)
TSH SERPL-ACNC: 2.93 MIU/L (ref 0.44–3.98)
VIT B12 SERPL-MCNC: 267 PG/ML (ref 211–911)
VLDL: 28 MG/DL (ref 0–40)
WBC # BLD AUTO: 7.7 X10*3/UL (ref 4.4–11.3)

## 2024-10-08 PROCEDURE — 76770 US EXAM ABDO BACK WALL COMP: CPT | Performed by: RADIOLOGY

## 2024-10-08 PROCEDURE — 84443 ASSAY THYROID STIM HORMONE: CPT

## 2024-10-08 PROCEDURE — 85027 COMPLETE CBC AUTOMATED: CPT

## 2024-10-08 PROCEDURE — G0103 PSA SCREENING: HCPCS

## 2024-10-08 PROCEDURE — 80061 LIPID PANEL: CPT

## 2024-10-08 PROCEDURE — 82306 VITAMIN D 25 HYDROXY: CPT

## 2024-10-08 PROCEDURE — 83036 HEMOGLOBIN GLYCOSYLATED A1C: CPT

## 2024-10-08 PROCEDURE — 80053 COMPREHEN METABOLIC PANEL: CPT

## 2024-10-08 PROCEDURE — 36415 COLL VENOUS BLD VENIPUNCTURE: CPT

## 2024-10-08 PROCEDURE — 76770 US EXAM ABDO BACK WALL COMP: CPT

## 2024-10-08 PROCEDURE — 82607 VITAMIN B-12: CPT

## 2024-10-15 ENCOUNTER — TELEMEDICINE (OUTPATIENT)
Dept: UROLOGY | Facility: HOSPITAL | Age: 89
End: 2024-10-15
Payer: MEDICARE

## 2024-10-15 DIAGNOSIS — N28.1 RENAL CYST, LEFT: Primary | ICD-10-CM

## 2024-10-15 PROCEDURE — 1123F ACP DISCUSS/DSCN MKR DOCD: CPT | Performed by: STUDENT IN AN ORGANIZED HEALTH CARE EDUCATION/TRAINING PROGRAM

## 2024-10-15 PROCEDURE — 99214 OFFICE O/P EST MOD 30 MIN: CPT | Performed by: STUDENT IN AN ORGANIZED HEALTH CARE EDUCATION/TRAINING PROGRAM

## 2024-10-15 NOTE — PROGRESS NOTES
Subjective   Patient ID: Carlos Alberto Seth is a 89 y.o. male    HPI  Today's visit was done virtually after appropriate consent from the patient.    89 y.o. male who  presents for 1 year follow-up regarding a renal mass that has been stable for approximately 30 years. The mass was initially discovered incidentally during scans for suspected prostate cancer, which was never confirmed. Over the years, the patient has undergone various imaging modalities, including CAT scans, MRIs, and ultrasounds, all of which have shown no significant changes in the mass. The patient reports no symptoms such as pain, infection, or urinary issues. He has been followed by multiple urologists, including Dr. Nav Avalos and Dr. Desir. The patient is in good health otherwise and has no significant medical concerns. He is seeking guidance on whether to continue monitoring the mass or to consider further intervention.    Renal US, conducted on 11/2/2023, revealed:  1. Interval increase in size of the complex, multi-septated cystic  lesion within the interpolar region of the left kidney when compared  to most recent prior renal ultrasound and MRI (10/25/2022) which may  be due to differences in measuring technique. If there is persistent  clinical concern, a repeat dedicated MRI renal protocol could be  obtained for further characterization.  2. Interval increase in size of the previously noted lobulated cyst  within the superior pole of the left kidney which now contains  layering internal debris which may represent blood products.  3. Redemonstration of prostatomegaly.    The most recent Renal US, conducted on 10/8/2024, revealed:  1. Allowing for differences in technique, the complex multi-septated  cystic lesion within the left kidney is mildly increased in size  compared to prior exam.  2. Similar-appearing right peripelvic cysts.      Review of Systems    All systems were reviewed. Anything negative was noted in the  HPI.    Objective   Physical Exam    General: Well developed, well nourished, alert and cooperative, appears in no acute distress   Eyes: Non-injected conjunctiva, sclera clear, no proptosis   Cardiac: Extremities are warm and well perfused. No edema, cyanosis or pallor   Lungs: Breathing is easy, non-labored. Speaking in clear and complete sentences. Normal diaphragmatic movement   MSK: Ambulatory with steady gait, unassisted   Neuro: Alert and oriented to person, place, and time   Psych: Demonstrates good judgment and reason, without hallucinations, abnormal affect or abnormal behaviors   Skin: No obvious lesions, no rashes       No CVA tenderness bilaterally   No suprapubic pain or discomfort       Past Medical History:   Diagnosis Date    Cyst of kidney, acquired     Cyst, kidney, acquired    Cyst of kidney, acquired 02/28/2018    Cyst, kidney, acquired    Disorder of the skin and subcutaneous tissue, unspecified 03/27/2019    Skin lesion    Disorder of the skin and subcutaneous tissue, unspecified 04/02/2019    Skin lesion    Impacted cerumen, right ear 06/06/2019    Impacted cerumen of right ear    Other conditions influencing health status     Reported Prostate Antigen Blood Test    Other conditions influencing health status     Colonoscopy (Fiberoptic)    Other specified diseases of anus and rectum     Rectal mass    Personal history of other diseases of male genital organs     History of benign prostatic hypertrophy    Personal history of other diseases of the circulatory system     History of hypertension    Personal history of other endocrine, nutritional and metabolic disease 01/11/2017    History of hyperlipidemia    Personal history of other infectious and parasitic diseases 03/23/2020    History of herpes zoster    Personal history of other specified conditions 04/12/2022    History of elevated prostate specific antigen (PSA)         Past Surgical History:   Procedure Laterality Date    HERNIA REPAIR   06/11/2013    Hernia Repair    OTHER SURGICAL HISTORY  12/18/2013    Needle Biopsy Of Prostate    OTHER SURGICAL HISTORY  12/18/2013    Rectal Surgery    TONSILLECTOMY  06/11/2013    Tonsillectomy       Assessment/Plan   Renal mass, stable for 30 years    89 y.o. male who presents for the above condition, We had a very long and extensive discussion with the patient regarding the pathophysiology, differential diagnosis, risk factor, management, natural history, incidence and diagnostic work-up of the condition.      Plan:  - Follow up in 1 year with Renal US        10/15/2024    Jagjit Attestation  By signing my name below, I, Jagjit Hernandez   attest that this documentation has been prepared under the direction and in the presence of Dr. Jc Vick

## 2024-10-16 NOTE — PROGRESS NOTES
"Subjective   Patient ID: Carlos Alberto Seth is a 89 y.o. male who presents for Medicare Annual Wellness Visit Subsequent.    HPI   The patient reports that he is taking metoprolol and amlodipine for hypertension, Pepcid for GERD and finasteride and tamsulosin for BPH. He is taking these medications as prescribed and is tolerating them well. He mentions that generally he is doing well, he goes out for daily walks with his brother.    Review of Systems  Constitutional: No fever or chills, No Night Sweats  Eyes: No Blurry Vision or Eye sight problems  ENT: No Nasal Discharge, Hoarseness, sore throat  Cardiovascular: no chest pain, no palpitations and no syncope.   Respiratory: no cough, no shortness of breath during exertion and no shortness of breath at rest.   Gastrointestinal: no abdominal pain, no nausea and no vomiting.   : No issues with urinary stream, burning with urination, no blood in urine or stools  Skin: No Skin rashes or Lesions  Neuro: No Headache, no dizziness or Numbness or tingling  Psych: No Anxiety, depression or sleeping problems  Heme: No Easy bleeding or brusing.     Objective   /78   Pulse 69   Ht 1.727 m (5' 8\")   Wt 75.3 kg (166 lb)   SpO2 97%   BMI 25.24 kg/m²     Physical Exam  Constitutional: Alert and in no acute distress. Well developed, well nourished.   Head and Face: Head and face: Normal.    Eyes: Normal external exam. Pupils were equal in size, round, reactive to light (PERRL) with normal accommodation and extraocular movements intact (EOMI).   Ears, Nose, Mouth, and Throat: External inspection of ears and nose: Normal.  Hearing: Normal.  Nasal mucosa, septum, and turbinates: Normal.  Lips, teeth, and gums: Normal.  Oropharynx: Normal.   Neck: No neck mass was observed. Supple. Thyroid not enlarged and there were no palpable thyroid nodules.   Cardiovascular: Heart rate and rhythm were normal, normal S1 and S2. Pedal pulses: Normal. No peripheral edema.   Pulmonary: No " respiratory distress. Clear bilateral breath sounds.   Abdomen: Soft nontender; no abdominal mass palpated. Normal bowel sounds. No organomegaly.   Musculoskeletal: No joint swelling seen, normal movements of all extremities. Range of motion: Normal.  Muscle strength/tone: Normal.    Skin: Normal skin color and pigmentation, normal skin turgor, and no rash.   Neurologic: Deep tendon reflexes were 2+ and symmetric.   Psychiatric: Judgment and insight: Intact. Mood and affect: Normal.  Lymphatic: No cervical lymphadenopathy. Palpation of lymph nodes in axillae: Normal.  Palpation of lymph nodes in groin: Normal.    Lab Results   Component Value Date    WBC 7.7 10/08/2024    HGB 15.4 10/08/2024    HCT 48.2 10/08/2024     10/08/2024    CHOL 174 10/08/2024    TRIG 140 10/08/2024    HDL 56.1 10/08/2024    ALT 10 10/08/2024    AST 16 10/08/2024     10/08/2024    K 4.6 10/08/2024     10/08/2024    CREATININE 1.07 10/08/2024    BUN 20 10/08/2024    CO2 24 10/08/2024    TSH 2.93 10/08/2024    HGBA1C 5.2 10/08/2024       US renal complete  Narrative: Interpreted By:  Trace Garza,  and Valentin Morocho   STUDY:  US RENAL COMPLETE;  10/8/2024 2:00 pm      INDICATION:  Signs/Symptoms:LEFT COMPLEXT RENAL CYST.      ,N28.1 Cyst of kidney, acquired      COMPARISON:  Renal ultrasound 11/02/2023, MRI kidney 10/25/2022      ACCESSION NUMBER(S):  [id number]      ORDERING CLINICIAN:  ANGELINA WILCOX      TECHNIQUE:  Multiple images of the kidneys were obtained  .      FINDINGS:  RIGHT KIDNEY:  The right kidney measures 10.7 cm in length. The renal cortical  echogenicity and thickness are within normal limits. No  hydronephrosis is present; no evidence of nephrolithiasis.  Redemonstrated simple parapelvic cysts within the lower pole and  interpolar kidney measuring 2.6 x 0.7 x 1.2 cm and 1.5 x 0.8 x 1.3 cm  respectively. Previously the parapelvic cysts measured 2.6 x 0.9 x  1.2 cm and 2.1 x 1.4 cm for the  lower and interpolar regions  respectively.      LEFT KIDNEY:  The left kidney measures 13.9 cm in length. The renal cortical  echogenicity and thickness are within normal limits. No  hydronephrosis is present; no evidence of nephrolithiasis.      Redemonstrated multi-septated cysts within the left kidney measuring  up to 10.0 x 5.4 x 7.2 cm which is increased in size compared to  11/02/2023 ultrasound measuring 8.8 x 5.9 x 8.3 cm.      Similar complex appearing cyst with multiple internal echoes  measuring 4.0 x 5.1 x 3.5 cm is noted, previously 5.1 x 3.0 x 3.1 cm.      BLADDER:  There is again mass effect of the prostate gland on the posterior  aspect of the bladder. Otherwise, the bladder is unremarkable.  Prostate measures 5.0 x 5.0 cm.      Impression: 1. Allowing for differences in technique, the complex multi-septated  cystic lesion within the left kidney is mildly increased in size  compared to prior exam.  2. Similar-appearing right peripelvic cysts.      I personally reviewed the images/study and I agree with the findings  as stated by Rashawn Hanson MD. This study was interpreted at  [facility], [place], [place].      MACRO:  None      Signed by: Trace Garza 10/8/2024 9:47 PM  Dictation workstation:   [id number]      Assessment/Plan   Diagnoses and all orders for this visit:  Medicare annual wellness visit, subsequent  -     1 Year Follow Up In [facility] - PCP - Wellness Exam; Future  Benign essential HTN  -     Follow Up In [facility] - PCP - Medicare Annual  -     metoprolol tartrate (Lopressor) 25 mg tablet; Take 1 tablet (25 mg) by mouth once daily.  -     amLODIPine (Norvasc) 5 mg tablet; Take 1 tablet (5 mg) by mouth once daily.  -     Follow Up In [facility] - PCP - Established; Future  -     Comprehensive Metabolic Panel; Future  Dyspepsia  -     famotidine (Pepcid) 20 mg tablet; Take 1 tablet (20 mg) by mouth 2 times a  day.  BPH with obstruction/lower urinary tract symptoms  -     tamsulosin (Flomax) 0.4 mg 24 hr capsule; Take 1 capsule (0.4 mg) by mouth once daily.  -     finasteride (Proscar) 5 mg tablet; Take 1 tablet (5 mg) by mouth once daily.        Dear Carlos Alberto eSth     It was my pleasure to take care of you today in the office. Below are the things we discussed today:    1. Immunizations: Yearly Flu shot is recommended. Up-to-date          a: COVID: Booster up-to-date          b: Tetanus: Up-to-date         c: Shingrix: Up-to-date         d: RSV: Please get from the pharmacy          e: Prevnar: Up-to-date    2. Blood Work:  3. Seen your dentist twice a year  4. Yearly Eye exam is recommended    5. BMI: Overweight   6: Diet recommendations:   Eat Clean, Try to have as many home cooked meals as possible  Avoid processed foods which contain excess calories, sugar, and sodium.    7. Exercise recommendations:   150 minutes a week to maintain your weight     If you have to loose weight, you need a better diet and exercise plan.     8. Please get your Living will / Advance directive completed if you do not have one already. Please make sure our office has a copy of the latest one.     9. Colonoscopy: Not indicated    10. PSA: Up-to-date     11. Hypertension: Well- controlled. Continue metoprolol and amlodipine.      12. GERD: Continue Pepcid.      13. BPH: The patient is on finasteride and Tasmulosin.    14. Vitamin B-12 deficiency: Take over-the-counter vitamin B-12 3 times weekly.     Follow up in 6 months.    Follow up in one year for a Physical. Please call the office before your Physical to see if you need blood work completed prior to your physical.     Please call me if any questions arise from now until your next visit. I will call you after I am done seeing patients. A Doctor is always available by phone when the office is closed. Please feel free to call for help with any problem that you feel shouldn't wait until  the office re-opens.     Scribe Attestation  By signing my name below, I, Nunu Cope, Jagjit   attest that this documentation has been prepared under the direction and in the presence of Rhea Fairbanks MD.

## 2024-10-17 ENCOUNTER — APPOINTMENT (OUTPATIENT)
Dept: PRIMARY CARE | Facility: CLINIC | Age: 89
End: 2024-10-17
Payer: MEDICARE

## 2024-10-17 VITALS
BODY MASS INDEX: 25.16 KG/M2 | HEART RATE: 69 BPM | HEIGHT: 68 IN | OXYGEN SATURATION: 97 % | WEIGHT: 166 LBS | SYSTOLIC BLOOD PRESSURE: 124 MMHG | DIASTOLIC BLOOD PRESSURE: 78 MMHG

## 2024-10-17 DIAGNOSIS — Z00.00 MEDICARE ANNUAL WELLNESS VISIT, SUBSEQUENT: Primary | ICD-10-CM

## 2024-10-17 DIAGNOSIS — I10 BENIGN ESSENTIAL HTN: ICD-10-CM

## 2024-10-17 DIAGNOSIS — N13.8 BPH WITH OBSTRUCTION/LOWER URINARY TRACT SYMPTOMS: ICD-10-CM

## 2024-10-17 DIAGNOSIS — N40.1 BPH WITH OBSTRUCTION/LOWER URINARY TRACT SYMPTOMS: ICD-10-CM

## 2024-10-17 DIAGNOSIS — R10.13 DYSPEPSIA: ICD-10-CM

## 2024-10-17 RX ORDER — TAMSULOSIN HYDROCHLORIDE 0.4 MG/1
0.4 CAPSULE ORAL DAILY
Qty: 90 CAPSULE | Refills: 1 | Status: SHIPPED | OUTPATIENT
Start: 2024-10-17

## 2024-10-17 RX ORDER — METOPROLOL TARTRATE 25 MG/1
25 TABLET, FILM COATED ORAL DAILY
Qty: 90 TABLET | Refills: 1 | Status: SHIPPED | OUTPATIENT
Start: 2024-10-17

## 2024-10-17 RX ORDER — FAMOTIDINE 20 MG/1
20 TABLET, FILM COATED ORAL 2 TIMES DAILY
Qty: 180 TABLET | Refills: 1 | Status: SHIPPED | OUTPATIENT
Start: 2024-10-17

## 2024-10-17 RX ORDER — AMLODIPINE BESYLATE 5 MG/1
5 TABLET ORAL DAILY
Qty: 90 TABLET | Refills: 1 | Status: SHIPPED | OUTPATIENT
Start: 2024-10-17

## 2024-10-17 RX ORDER — FINASTERIDE 5 MG/1
5 TABLET, FILM COATED ORAL DAILY
Qty: 90 TABLET | Refills: 1 | Status: SHIPPED | OUTPATIENT
Start: 2024-10-17

## 2024-10-17 ASSESSMENT — ACTIVITIES OF DAILY LIVING (ADL)
GROCERY_SHOPPING: INDEPENDENT
DOING_HOUSEWORK: INDEPENDENT
TAKING_MEDICATION: INDEPENDENT
MANAGING_FINANCES: INDEPENDENT
BATHING: INDEPENDENT
DRESSING: INDEPENDENT

## 2024-10-17 ASSESSMENT — PATIENT HEALTH QUESTIONNAIRE - PHQ9
1. LITTLE INTEREST OR PLEASURE IN DOING THINGS: NOT AT ALL
SUM OF ALL RESPONSES TO PHQ9 QUESTIONS 1 AND 2: 0
2. FEELING DOWN, DEPRESSED OR HOPELESS: NOT AT ALL

## 2024-10-17 ASSESSMENT — ENCOUNTER SYMPTOMS
LOSS OF SENSATION IN FEET: 0
DEPRESSION: 0
OCCASIONAL FEELINGS OF UNSTEADINESS: 0

## 2025-02-05 ENCOUNTER — APPOINTMENT (OUTPATIENT)
Dept: DERMATOLOGY | Facility: CLINIC | Age: OVER 89
End: 2025-02-05
Payer: MEDICARE

## 2025-02-05 DIAGNOSIS — L57.0 ACTINIC KERATOSIS: ICD-10-CM

## 2025-02-05 DIAGNOSIS — L82.1 SEBORRHEIC KERATOSIS: ICD-10-CM

## 2025-02-05 DIAGNOSIS — D22.9 MULTIPLE BENIGN NEVI: Primary | ICD-10-CM

## 2025-02-05 DIAGNOSIS — Z85.828 PERSONAL HISTORY OF SKIN CANCER: ICD-10-CM

## 2025-02-05 DIAGNOSIS — Z12.83 ENCOUNTER FOR SCREENING FOR MALIGNANT NEOPLASM OF SKIN: ICD-10-CM

## 2025-02-05 DIAGNOSIS — D48.5 NEOPLASM OF UNCERTAIN BEHAVIOR OF SKIN: ICD-10-CM

## 2025-02-05 ASSESSMENT — DERMATOLOGY QUALITY OF LIFE (QOL) ASSESSMENT
DATE THE QUALITY-OF-LIFE ASSESSMENT WAS COMPLETED: 67241
WHAT SINGLE SKIN CONDITION LISTED BELOW IS THE PATIENT ANSWERING THE QUALITY-OF-LIFE ASSESSMENT QUESTIONS ABOUT: NONE OF THE ABOVE
RATE HOW EMOTIONALLY BOTHERED YOU ARE BY YOUR SKIN PROBLEM (FOR EXAMPLE, WORRY, EMBARRASSMENT, FRUSTRATION): 0 - NEVER BOTHERED
RATE HOW BOTHERED YOU ARE BY SYMPTOMS OF YOUR SKIN PROBLEM (EG, ITCHING, STINGING BURNING, HURTING OR SKIN IRRITATION): 0 - NEVER BOTHERED
ARE THERE EXCLUSIONS OR EXCEPTIONS FOR THE QUALITY OF LIFE ASSESSMENT: NO
RATE HOW BOTHERED YOU ARE BY EFFECTS OF YOUR SKIN PROBLEMS ON YOUR ACTIVITIES (EG, GOING OUT, ACCOMPLISHING WHAT YOU WANT, WORK ACTIVITIES OR YOUR RELATIONSHIPS WITH OTHERS): 0 - NEVER BOTHERED

## 2025-02-05 ASSESSMENT — DERMATOLOGY PATIENT ASSESSMENT
HAVE YOU HAD OR DO YOU HAVE VASCULAR DISEASE: NO
ARE YOU AN ORGAN TRANSPLANT RECIPIENT: NO
DO YOU HAVE ANY NEW OR CHANGING LESIONS: NO
HAVE YOU HAD OR DO YOU HAVE A STAPH INFECTION: NO
DO YOU USE A TANNING BED: NO

## 2025-02-05 ASSESSMENT — ITCH NUMERIC RATING SCALE: HOW SEVERE IS YOUR ITCHING?: 0

## 2025-02-05 ASSESSMENT — PATIENT GLOBAL ASSESSMENT (PGA): PATIENT GLOBAL ASSESSMENT: PATIENT GLOBAL ASSESSMENT:  1 - CLEAR

## 2025-02-05 NOTE — PROGRESS NOTES
Subjective     Carlos Alberto Seth is a 89 y.o. male who presents for the following: Skin Check (FBSE, area of concern near left ear, keeps coming open. HX of skin cancer).     Review of Systems:  No other skin or systemic complaints other than what is documented elsewhere in the note.    The following portions of the chart were reviewed this encounter and updated as appropriate:         Skin Cancer History  10/2019 nBCC left temple s/p Mohs  10/2019 CC right back s/p ED&C  12/2019 [facility] left abdomen s/p ED&C  9/2021 [facility] nasal tip s/p Mohs      Specialty Problems    None       Objective   Well appearing patient in no apparent distress; mood and affect are within normal limits.    A full examination was performed including scalp, head, eyes, ears, nose, lips, neck, chest, axillae, abdomen, back, buttocks, bilateral upper extremities, bilateral lower extremities, hands, feet, fingers, toes, fingernails, and toenails. All findings within normal limits unless otherwise noted below.    Assessment/Plan   1. Neoplasm of uncertain behavior of skin (2)  Left mid shin  Red papule with crust and telangiectasias              Lesion biopsy  Type of biopsy: tangential    Informed consent: discussed and consent obtained    Timeout: patient name, date of birth, surgical site, and procedure verified    Procedure prep:  Patient was prepped and draped  Anesthesia: the lesion was anesthetized in a standard fashion    Anesthetic:  1% lidocaine w/ epinephrine 1-100,000 local infiltration  Instrument used: DermaBlade    Hemostasis achieved with: aluminum chloride    Outcome: patient tolerated procedure well    Post-procedure details: sterile dressing applied and wound care instructions given    Dressing type: petrolatum and bandage      Specimen 1 - Dermatopathology- DERM LAB  Differential Diagnosis: r/o BCC  Check Margins Yes/No?:    Comments:    Dermpath Lab: Routine Histopathology (formalin-fixed tissue)    Left temple  Erythematous  macule with scale and leaf like pigmented structures              Lesion biopsy  Type of biopsy: tangential    Informed consent: discussed and consent obtained    Timeout: patient name, date of birth, surgical site, and procedure verified    Procedure prep:  Patient was prepped and draped  Anesthesia: the lesion was anesthetized in a standard fashion    Anesthetic:  1% lidocaine w/ epinephrine 1-100,000 local infiltration  Instrument used: DermaBlade    Hemostasis achieved with: aluminum chloride    Outcome: patient tolerated procedure well    Post-procedure details: sterile dressing applied and wound care instructions given    Dressing type: petrolatum and bandage      Specimen 2 - Dermatopathology- DERM LAB  Differential Diagnosis: r/o BCC  Check Margins Yes/No?:    Comments:    Dermpath Lab: Routine Histopathology (formalin-fixed tissue)    2. Encounter for screening for malignant neoplasm of skin    Related Procedures  Follow Up In Dermatology - Established Patient    3. Actinic keratosis (5)  Scalp (5)  Erythematous macules with gritty scale.    Destr of lesion - Scalp (5)  Complexity: simple    Destruction method: cryotherapy    Informed consent: discussed and consent obtained    Lesion destroyed using liquid nitrogen: Yes    Region frozen until ice ball extended beyond lesion: Yes    Cryotherapy cycles:  1  Outcome: patient tolerated procedure well with no complications    Post-procedure details: wound care instructions given      4. Multiple benign nevi  Brown and tan macules and papules with reassuring findings on dermoscopy    -These lesions have benign, reassuring patterns on dermoscopy  -Recommend continued self observation, and to contact the office if any changes in nevi are noticed    5. Seborrheic keratosis  Stuck on, waxy macule(s)/papule(s)/plaque(s) with comedo-like openings and milia like cysts    -Discussed the nature of the diagnosis  -Reassurance, recommend continued observation    6. Personal  history of skin cancer    Personal History of Non-Melanoma Skin Cancer  -Well healed scar(s) with no evidence of recurrence  -Discussed the need for annual or semi-annual skin examinations and to return sooner if any new or changing lesions are noticed. Patient verbalizes understanding      RTC 6 months FBSE    Cathryn Cleary MD    I was present during all key portions of visit including history, exam, discussion/plan and/or procedures and directly supervised our resident during all portions of the visit, follow up care, medications and more    Tucker Freeman MD

## 2025-02-10 DIAGNOSIS — C44.91 BASAL CELL CARCINOMA (BCC), UNSPECIFIED SITE: Primary | ICD-10-CM

## 2025-04-02 ENCOUNTER — APPOINTMENT (OUTPATIENT)
Dept: DERMATOLOGY | Facility: CLINIC | Age: OVER 89
End: 2025-04-02
Payer: MEDICARE

## 2025-04-09 ENCOUNTER — APPOINTMENT (OUTPATIENT)
Dept: DERMATOLOGY | Facility: CLINIC | Age: OVER 89
End: 2025-04-09
Payer: MEDICARE

## 2025-04-09 VITALS — HEART RATE: 92 BPM | SYSTOLIC BLOOD PRESSURE: 157 MMHG | DIASTOLIC BLOOD PRESSURE: 94 MMHG

## 2025-04-09 DIAGNOSIS — N40.1 BPH WITH OBSTRUCTION/LOWER URINARY TRACT SYMPTOMS: ICD-10-CM

## 2025-04-09 DIAGNOSIS — I10 BENIGN ESSENTIAL HTN: ICD-10-CM

## 2025-04-09 DIAGNOSIS — C44.319 BASAL CELL CARCINOMA (BCC) OF SKIN OF OTHER PART OF FACE: ICD-10-CM

## 2025-04-09 DIAGNOSIS — N13.8 BPH WITH OBSTRUCTION/LOWER URINARY TRACT SYMPTOMS: ICD-10-CM

## 2025-04-09 PROCEDURE — 99214 OFFICE O/P EST MOD 30 MIN: CPT | Performed by: DERMATOLOGY

## 2025-04-09 PROCEDURE — 17311 MOHS 1 STAGE H/N/HF/G: CPT | Performed by: DERMATOLOGY

## 2025-04-09 PROCEDURE — 13132 CMPLX RPR F/C/C/M/N/AX/G/H/F: CPT | Performed by: DERMATOLOGY

## 2025-04-09 NOTE — PROGRESS NOTES
Mohs Surgery Operative Note    Date of Surgery:  4/9/2025  Surgeon:  Yobani Ricks MD  Office Location: [address]  [facility] [address]  Dept: [phone]  Dept Fax: [phone]  Referring Provider: Tucker Freeman MD  [address] [place] [address]   [facility]  [address] [facility] [address]  [place] [id number]      Assessment/Plan   Pre-procedure:   Obtained informed consent: written from patient  The surgical site was identified and confirmed with the patient.     Intra-operative:   Audible time out called at : 01:11 PM 04/09/25  by: JULIET FAUST RN   Verified patient name, birthdate, site, specimen bottle label & requisition.    The planned procedure(s) was again reviewed with the patient. The risks of bleeding, infection, nerve damage and scarring were reviewed. Written authorization was obtained. The patient identity, surgical site, and planned procedure(s) were verified. The provider acted as both surgeon and pathologist.     Basal cell carcinoma (BCC) of skin of other part of face  Left [employer]    Mohs surgery    Consent obtained: written    Universal Protocol:  Procedure explained and questions answered to patient or proxy's satisfaction: Yes    Test results available and properly labeled: Yes    Pathology report reviewed: Yes    External notes reviewed: Yes    Photo or diagram used for site identification: Yes    Site/side marked: Yes    Slide independently reviewed by Mohs surgeon: Yes    Immediately prior to procedure a time out was called: Yes    Patient identity confirmed: verbally with patient  Preparation: Patient was prepped and draped in usual sterile fashion      Anticoagulation:  Is the patient taking prescription anticoagulant and/or aspirin prescribed/recommended by a physician? No    Was the anticoagulation regimen changed prior to Mohs? No      Anesthesia:  Anesthesia method: local infiltration  Local anesthetic: lidocaine 1% WITH  epi    Procedure Details:  Case ID Number: [id number]  Biopsy accession number: [id number]  Date of biopsy: 2/5/2025  Frozen section biopsy performed: No    Specimen debulked: No    Pre-Op diagnosis: basal cell carcinoma  BCC subtype: infiltrative and nodular  Surgical site (from skin exam): Left [employer]  Pre-operative length (cm): 1  Pre-operative width (cm): 1  Indications for Mohs surgery: anatomic location where tissue conservation is critical  Previously treated? No      Micrographic Surgery Details:  Post-operative length (cm): 1.5  Post-operative width (cm): 1.5  Number of Mohs stages: 1    Stage 1     Comments: The patient was brought into the operating room and placed in the procedure chair in the appropriate position.  The area positive by previous biopsy was identified and confirmed with the patient. The area of clinically obvious tumor was debulked using a curette and/or scalpel as needed. An incision was made following the Mohs approach through the skin. The specimen was taken to the lab, divided into 2 piece(s) and appropriately chromacoded and processed.                 Tumor features identified on Mohs section: no tumor identified    Depth of defect: subcutaneous fat    Patient tolerance of procedure: tolerated well, no immediate complications    Reconstruction:  Was the defect reconstructed? Yes    Was reconstruction performed by the same Mohs surgeon? Yes    Setting of reconstruction: outpatient office  When was reconstruction performed? same day  Type of reconstruction: linear  Linear reconstruction: complex  Length of linear repair (cm): 4.5  Subcutaneous Layers (Deep Stitches)   Suture size:  4-0  Suture type:  Vicryl  Stitches:  Buried vertical mattress  Fine/surface layer approximation (top stitches)   Epidermal/Superficial suture size:  5-0  Epidermal/Superficial suture type:  Fast-absorbing gut  Stitches: simple interrupted and simple running    Hemostasis achieved with:  electrodesiccation  Outcome: patient tolerated procedure well with no complications    Post-procedure details: sterile dressing applied and wound care instructions given    Dressing type: pressure dressing          Complex Linear Repair - Wide Undermining:  Given the location and size of the defect, it was determined that a complex layered linear closure was required to restore normal anatomy and function. The repair was considered complex because extensive undermining was required and performed. The amount of undermining performed was greater than the maximum width of the defect as measured perpendicular to the closure line along at least one entire edge of the defect. Standing cutaneous cones were removed using Burow's triangles. The wound edges were brought into close approximation with buried vertical mattress sutures. The remainder of the wound was then closed with epidermal top sutures.        The final repair measured 4.5 cm                Wound care was discussed, and the patient was given written post-operative wound care instructions.      The patient will follow up with Yobani Ricks MD as needed for any post operative problems or concerns, and will follow up with their primary dermatologist as scheduled.

## 2025-04-09 NOTE — PROGRESS NOTES
Office Visit Note  Date: 4/9/2025  Surgeon:  Yobani Ricks MD  Office Location: [address]  [facility] [address]  Dept: [phone]  Dept Fax: [phone]  Referring Provider: Tucker Freeman MD  [id number] [address]  Department of Dermatology  [address]   Carlos Alberto Seth is a 89 y.o. male who presents for the following: MOHS Surgery (Left Nondenominational)    According to the patient, the lesion has been present for approximately greater than 1 year at the time of diagnosis.  The lesion is not causing symptoms.  The lesion has not been treated previously.    The patient does not have a pacemaker / defibrillator.  The patient does not have a heart valve / joint replacement.    The patient is not on blood thinners.  The patient does not have a history of hepatitis B or C.  The patient does not have a history of HIV.  The patient does not have a history of immunosuppression (e.g. organ transplantation, malignancy, medications)    Review of Systems:  No other skin or systemic complaints other than what is documented elsewhere in the note.    MEDICAL HISTORY: clinically relevant history including significant past medical history, medications and allergies was reviewed and documented in Epic.    Objective   Well appearing patient in no apparent distress; mood and affect are within normal limits.  Vital signs: See record.  Noted on the Left Temple  Is a 1 x 1 cm scar        The patient confirmed the identified site.    Discussion:  The nature of the diagnosis was explained. The lesion is a skin cancer.  It has a risk of local growth and distant spread. The condition is associated with sun exposure.  Warning signs of non-melanoma skin cancer discussed. Patient was instructed to perform monthly self skin examination.  We recommended that the patient have regular full skin exams given an increased risk of subsequent skin cancers. The patient was  instructed to use sun protective behaviors including use of broad spectrum sunscreens and sun protective clothing to reduce risk of skin cancers.      Risks, benefits, side effects of Mohs surgery were discussed with patient and the patient voiced understanding.  It was explained that even though the cure rate of Mohs is very high it is not 100%. Risks of surgery including but not limited to bleeding, infection, numbness, nerve damage, and scar were reviewed.  Discussion included wound care requirements, activity restrictions, likely scar outcome and time to heal.     After Mohs surgery, the defect may need to be repaired surgically and the scar may be longer than the original lesion.  Reconstruction options, risks, and benefits were reviewed including second intention healing, linear repair (4-1 ratio was explained), local flaps, skin grafts, cartilage grafts and interpolation flaps (the need for multiple surgeries was explained). Possible outcomes were reviewed including likely scar appearance, failure of flap survival, infection, bleeding and the need for revision surgery.     The pathology was reviewed, the photograph was reviewed, and the referring physician's note was reviewed.    Patient elected for Mohs surgery.    The patient has a basal cell carcinoma.  The pathology was reviewed, the photograph was reviewed, and the referring physicians note was reviewed.  Multiple treatment options including mohs surgery (which has moderate risk of morbidity) were reviewed.    Medical Decision Making  Column 1- Basal Cell Carcinoma (1 acute uncomplicated illness- Low)  Column 2- 3 tests reviewed (pathology, photograph, refering physician notes- Moderate)  Column 3- Modertate risk of morbidity from additional treatment- mohs surgery- Moderate)    Overall Moderate MDM

## 2025-04-10 RX ORDER — AMLODIPINE BESYLATE 5 MG/1
5 TABLET ORAL DAILY
Qty: 30 TABLET | Refills: 0 | Status: SHIPPED | OUTPATIENT
Start: 2025-04-10

## 2025-04-10 RX ORDER — TAMSULOSIN HYDROCHLORIDE 0.4 MG/1
0.4 CAPSULE ORAL DAILY
Qty: 30 CAPSULE | Refills: 0 | Status: SHIPPED | OUTPATIENT
Start: 2025-04-10

## 2025-04-17 ENCOUNTER — APPOINTMENT (OUTPATIENT)
Dept: PRIMARY CARE | Facility: CLINIC | Age: OVER 89
End: 2025-04-17
Payer: MEDICARE

## 2025-04-17 ENCOUNTER — PROCEDURE VISIT (OUTPATIENT)
Dept: DERMATOLOGY | Facility: CLINIC | Age: OVER 89
End: 2025-04-17
Payer: MEDICARE

## 2025-04-17 VITALS — DIASTOLIC BLOOD PRESSURE: 87 MMHG | SYSTOLIC BLOOD PRESSURE: 136 MMHG | HEART RATE: 81 BPM

## 2025-04-17 VITALS
OXYGEN SATURATION: 95 % | HEIGHT: 68 IN | SYSTOLIC BLOOD PRESSURE: 120 MMHG | WEIGHT: 165 LBS | DIASTOLIC BLOOD PRESSURE: 66 MMHG | BODY MASS INDEX: 25.01 KG/M2 | HEART RATE: 69 BPM

## 2025-04-17 DIAGNOSIS — I10 BENIGN ESSENTIAL HTN: Primary | ICD-10-CM

## 2025-04-17 DIAGNOSIS — N40.1 BPH WITH OBSTRUCTION/LOWER URINARY TRACT SYMPTOMS: ICD-10-CM

## 2025-04-17 DIAGNOSIS — N13.8 BPH WITH OBSTRUCTION/LOWER URINARY TRACT SYMPTOMS: ICD-10-CM

## 2025-04-17 DIAGNOSIS — R10.13 DYSPEPSIA: ICD-10-CM

## 2025-04-17 DIAGNOSIS — C44.719 BASAL CELL CARCINOMA (BCC) OF SKIN OF LEFT LOWER EXTREMITY INCLUDING HIP: ICD-10-CM

## 2025-04-17 PROCEDURE — 1160F RVW MEDS BY RX/DR IN RCRD: CPT | Performed by: FAMILY MEDICINE

## 2025-04-17 PROCEDURE — 99214 OFFICE O/P EST MOD 30 MIN: CPT | Performed by: FAMILY MEDICINE

## 2025-04-17 PROCEDURE — G2211 COMPLEX E/M VISIT ADD ON: HCPCS | Performed by: FAMILY MEDICINE

## 2025-04-17 PROCEDURE — 3074F SYST BP LT 130 MM HG: CPT | Performed by: FAMILY MEDICINE

## 2025-04-17 PROCEDURE — 1159F MED LIST DOCD IN RCRD: CPT | Performed by: FAMILY MEDICINE

## 2025-04-17 PROCEDURE — 1036F TOBACCO NON-USER: CPT | Performed by: FAMILY MEDICINE

## 2025-04-17 PROCEDURE — 3078F DIAST BP <80 MM HG: CPT | Performed by: FAMILY MEDICINE

## 2025-04-17 PROCEDURE — 1123F ACP DISCUSS/DSCN MKR DOCD: CPT | Performed by: FAMILY MEDICINE

## 2025-04-17 RX ORDER — OMEPRAZOLE 20 MG/1
20 CAPSULE, DELAYED RELEASE ORAL
Qty: 90 CAPSULE | Refills: 1 | Status: SHIPPED | OUTPATIENT
Start: 2025-04-17

## 2025-04-17 RX ORDER — TAMSULOSIN HYDROCHLORIDE 0.4 MG/1
0.4 CAPSULE ORAL DAILY
Qty: 90 CAPSULE | Refills: 1 | Status: SHIPPED | OUTPATIENT
Start: 2025-04-17

## 2025-04-17 RX ORDER — FAMOTIDINE 20 MG/1
20 TABLET, FILM COATED ORAL NIGHTLY
Qty: 90 TABLET | Refills: 1 | Status: SHIPPED | OUTPATIENT
Start: 2025-04-17

## 2025-04-17 RX ORDER — METOPROLOL TARTRATE 25 MG/1
25 TABLET, FILM COATED ORAL DAILY
Qty: 90 TABLET | Refills: 1 | Status: SHIPPED | OUTPATIENT
Start: 2025-04-17

## 2025-04-17 RX ORDER — FINASTERIDE 5 MG/1
5 TABLET, FILM COATED ORAL DAILY
Qty: 90 TABLET | Refills: 1 | Status: SHIPPED | OUTPATIENT
Start: 2025-04-17

## 2025-04-17 RX ORDER — AMLODIPINE BESYLATE 5 MG/1
5 TABLET ORAL DAILY
Qty: 90 TABLET | Refills: 1 | Status: SHIPPED | OUTPATIENT
Start: 2025-04-17

## 2025-04-17 NOTE — ASSESSMENT & PLAN NOTE
Stable. Continue tamsulosin and finasteride.    Orders:    tamsulosin (Flomax) 0.4 mg 24 hr capsule; Take 1 capsule (0.4 mg) by mouth once daily.    finasteride (Proscar) 5 mg tablet; Take 1 tablet (5 mg) by mouth once daily.

## 2025-04-17 NOTE — PROGRESS NOTES
"Subjective   Patient ID: Carlos Alberto Seth is a 89 y.o. male with PMH HTN, dyspepsia, BPH, postherpetic neuralgia who presents for Follow-up.    HPI   Patient agenda:   (1) dyspepsia    -currently taking famotidine bid, once when get up, one when go to bed   -until 1wk ago, been a significant problem where about half the time after lunch, get distress, and after dinner quite a bit.   -discomfort doesn't prevent patient from doing activities such as going to concerts, but patient has to be more aware of where bathrooms are   -sx are: belching a lot, lots of gas, am tired after, have some burning in abd (pointed towards RLQ), nausea, once a month vomit (NBNB)   -have not had problem for a week and wondering why and what to do to continue not having problems   -diet: black coffee with breakfast, 1 drink before dinner (Scotch, or wine)      Interval hx:   -seeing derm again today to take off a second lesion   -no falls     Meds:   #HTN  -metop  -amlodipine     #Dyspepsia   -famotidine     #BPH  -tamsulosin  -finasteride     #other:   -use sodium flouirde gel (for dentist)   -vitamin B12 (Dr. Fairbanks) 3x/wek     Chart review:   -derm 4/9/25: [facility] Mohs surgery on [facility] [place]     Review of Systems  Constitutional: No fever or chills  Cardiovascular: no chest pain, no palpitations and no syncope.   Respiratory: no cough, no shortness of breath during exertion and no shortness of breath at rest.   Gastrointestinal: + abd pain, +nausea (with GERD exacerbations), vomiting 1x/mo. No diarrhea or constipation (BM every AM), stool a little hard.   Neuro: No Headache, no dizziness.     Objective   /90   Pulse 69   Ht 1.727 m (5' 8\")   Wt 74.8 kg (165 lb)   SpO2 95%   BMI 25.09 kg/m²     Physical Exam  Constitutional: Alert and in no acute distress. Well developed, well nourished  Head and Face: Head and face: Normal.    Cardiovascular: Heart rate and rhythm were normal, normal S1 and S2. No peripheral edema.   Pulmonary: No " respiratory distress. Clear bilateral breath sounds.  Musculoskeletal: Gait and station: Normal. Muscle strength/tone: Normal.   Skin: Normal skin color and pigmentation, normal skin turgor, and no rash.    Psychiatric: Judgment and insight: Intact. Mood and affect: Normal.      Lab Results   Component Value Date    WBC 7.7 10/08/2024    HGB 15.4 10/08/2024    HCT 48.2 10/08/2024     10/08/2024    CHOL 174 10/08/2024    TRIG 140 10/08/2024    HDL 56.1 10/08/2024    ALT 10 10/08/2024    AST 16 10/08/2024     10/08/2024    K 4.6 10/08/2024     10/08/2024    CREATININE 1.07 10/08/2024    BUN 20 10/08/2024    CO2 24 10/08/2024    TSH 2.93 10/08/2024    HGBA1C 5.2 10/08/2024       US renal complete  Narrative: Interpreted By:  Trace Garza  and Valentin Morocho   STUDY:  US RENAL COMPLETE;  10/8/2024 2:00 pm      INDICATION:  Signs/Symptoms:LEFT COMPLEXT RENAL CYST.      ,N28.1 Cyst of kidney, acquired      COMPARISON:  Renal ultrasound 11/02/2023, MRI kidney 10/25/2022      ACCESSION NUMBER(S):  [id number]      ORDERING CLINICIAN:  ANGELINA WILCOX      TECHNIQUE:  Multiple images of the kidneys were obtained  .      FINDINGS:  RIGHT KIDNEY:  The right kidney measures 10.7 cm in length. The renal cortical  echogenicity and thickness are within normal limits. No  hydronephrosis is present; no evidence of nephrolithiasis.  Redemonstrated simple parapelvic cysts within the lower pole and  interpolar kidney measuring 2.6 x 0.7 x 1.2 cm and 1.5 x 0.8 x 1.3 cm  respectively. Previously the parapelvic cysts measured 2.6 x 0.9 x  1.2 cm and 2.1 x 1.4 cm for the lower and interpolar regions  respectively.      LEFT KIDNEY:  The left kidney measures 13.9 cm in length. The renal cortical  echogenicity and thickness are within normal limits. No  hydronephrosis is present; no evidence of nephrolithiasis.      Redemonstrated multi-septated cysts within the left kidney measuring  up to 10.0 x 5.4 x 7.2 cm  which is increased in size compared to  11/02/2023 ultrasound measuring 8.8 x 5.9 x 8.3 cm.      Similar complex appearing cyst with multiple internal echoes  measuring 4.0 x 5.1 x 3.5 cm is noted, previously 5.1 x 3.0 x 3.1 cm.      BLADDER:  There is again mass effect of the prostate gland on the posterior  aspect of the bladder. Otherwise, the bladder is unremarkable.  Prostate measures 5.0 x 5.0 cm.      Impression: 1. Allowing for differences in technique, the complex multi-septated  cystic lesion within the left kidney is mildly increased in size  compared to prior exam.  2. Similar-appearing right peripelvic cysts.      I personally reviewed the images/study and I agree with the findings  as stated by Rashawn Hanson MD. This study was interpreted at  [facility], [place], [place].      MACRO:  None      Signed by: Trace Garza 10/8/2024 9:47 PM  Dictation workstation:   [id number]      Assessment/Plan       Carlos Alberto Seth is a 89 y.o. male with PMH HTN, dyspepsia, BPH, postherpetic neuralgia who presents for Follow-up.    Overall, patient doing well. Counseled patient on changing diet to further reduce dyspepsia sx, proper way to take famotidine, and switching AM dose of famotidine to omeprazole.     #Dyspepsia   -counseled on: eating at least half breakfast before coffee, avoiding tomatoes, decreasing alcohol or at least drinking with or after dinner  -start omeprazole in the AM, at least 30 minutes before breakfast   -take famotidine at night     #HTN  -continue metoprolol, amlodipine     #BPH  -continue tamsulosin, finasteride     #health maintenance   -get RFPs   -RTC 6mo     Follow up in 6mo     Patient seen and discussed with Dr. Fairbanks.     Sugar Monreal  MS3

## 2025-04-17 NOTE — PROGRESS NOTES
"Subjective   Patient ID: Carlos Alberto Seth is a 89 y.o. male who presents for Follow-up.    HPI     The patient reports that he is taking metoprolol and amlodipine for hypertension, Pepcid for GERD and finasteride and tamsulosin for BPH. He is taking these medications as prescribed and is tolerating them well. Blood pressure is well controlled at this time.     Today he presents with concerns over dyspepsia. He has been experiencing dyspepsia for some time now and is on famotidine. He belches a lot, has discomfort in the lower abdominal quadrants, has nausea, vomits once a month and feels fatigued after a meal.  He takes coffee after his breakfast and cocktail sometimes before dinner.  Denies hematochezia or melena.  Encouraged to follow a healthier diet, no coffee on empty stomach and avoid spicy, citrus fruits, alcohol.  Take famotidine at night and cocktails with food.     He denies any urinary issues. He is following with Dermatology for basal cell carcinoma.     Denies any falls in the last year. He mentions that generally he is doing well, he goes out for daily walks with his brother.       Review of Systems  Constitutional: No fever or chills  Cardiovascular: no chest pain, no palpitations and no syncope.   Respiratory: no cough, no shortness of breath during exertion and no shortness of breath at rest.   Gastrointestinal: no abdominal pain, no nausea and no vomiting. +dyspepsia + bloating +belching  Neuro: No Headache, no dizziness    Objective   /66   Pulse 69   Ht 1.727 m (5' 8\")   Wt 74.8 kg (165 lb)   SpO2 95%   BMI 25.09 kg/m²     Physical Exam  Constitutional: Alert and in no acute distress. Well developed, well nourished  Head and Face: Head and face: Normal.    Cardiovascular: Heart rate and rhythm were normal, normal S1 and S2. No peripheral edema.   Pulmonary: No respiratory distress. Clear bilateral breath sounds.  Musculoskeletal: Gait and station: Normal. Muscle strength/tone: Normal. "   Skin: Normal skin color and pigmentation, normal skin turgor, and no rash.    Psychiatric: Judgment and insight: Intact. Mood and affect: Normal.      Lab Results   Component Value Date    WBC 7.7 10/08/2024    HGB 15.4 10/08/2024    HCT 48.2 10/08/2024     10/08/2024    CHOL 174 10/08/2024    TRIG 140 10/08/2024    HDL 56.1 10/08/2024    ALT 10 10/08/2024    AST 16 10/08/2024     10/08/2024    K 4.6 10/08/2024     10/08/2024    CREATININE 1.07 10/08/2024    BUN 20 10/08/2024    CO2 24 10/08/2024    TSH 2.93 10/08/2024    HGBA1C 5.2 10/08/2024       US renal complete  Narrative: Interpreted By:  Trace Garza  and Valentin Morocho   STUDY:  US RENAL COMPLETE;  10/8/2024 2:00 pm      INDICATION:  Signs/Symptoms:LEFT COMPLEXT RENAL CYST.      ,N28.1 Cyst of kidney, acquired      COMPARISON:  Renal ultrasound 11/02/2023, MRI kidney 10/25/2022      ACCESSION NUMBER(S):  [id number]      ORDERING CLINICIAN:  ANGELINA WILCOX      TECHNIQUE:  Multiple images of the kidneys were obtained  .      FINDINGS:  RIGHT KIDNEY:  The right kidney measures 10.7 cm in length. The renal cortical  echogenicity and thickness are within normal limits. No  hydronephrosis is present; no evidence of nephrolithiasis.  Redemonstrated simple parapelvic cysts within the lower pole and  interpolar kidney measuring 2.6 x 0.7 x 1.2 cm and 1.5 x 0.8 x 1.3 cm  respectively. Previously the parapelvic cysts measured 2.6 x 0.9 x  1.2 cm and 2.1 x 1.4 cm for the lower and interpolar regions  respectively.      LEFT KIDNEY:  The left kidney measures 13.9 cm in length. The renal cortical  echogenicity and thickness are within normal limits. No  hydronephrosis is present; no evidence of nephrolithiasis.      Redemonstrated multi-septated cysts within the left kidney measuring  up to 10.0 x 5.4 x 7.2 cm which is increased in size compared to  11/02/2023 ultrasound measuring 8.8 x 5.9 x 8.3 cm.      Similar complex appearing cyst  with multiple internal echoes  measuring 4.0 x 5.1 x 3.5 cm is noted, previously 5.1 x 3.0 x 3.1 cm.      BLADDER:  There is again mass effect of the prostate gland on the posterior  aspect of the bladder. Otherwise, the bladder is unremarkable.  Prostate measures 5.0 x 5.0 cm.      Impression: 1. Allowing for differences in technique, the complex multi-septated  cystic lesion within the left kidney is mildly increased in size  compared to prior exam.  2. Similar-appearing right peripelvic cysts.      I personally reviewed the images/study and I agree with the findings  as stated by Rashawn Hanson MD. This study was interpreted at  [facility], [place], [place].      MACRO:  None      Signed by: Trace Garza 10/8/2024 9:47 PM  Dictation workstation:   [id number]      Assessment/Plan   Assessment & Plan  Benign essential HTN  Well- controlled. Continue metoprolol and amlodipine.      Orders:    Follow Up In [facility] - PCP - Established    metoprolol tartrate (Lopressor) 25 mg tablet; Take 1 tablet (25 mg) by mouth once daily.    amLODIPine (Norvasc) 5 mg tablet; Take 1 tablet (5 mg) by mouth once daily.    Follow Up In [facility] - PCP - Medicare Annual; Future    Dyspepsia  Continue Pepcid at night.  Take omeprazole 20 mg first thing in the morning on an empty stomach.  No coffee on empty stomach and avoid spicy, citrus fruits, alcohol, cocktails with dinner.  Let me know if symptoms do not improve.   Orders:    famotidine (Pepcid) 20 mg tablet; Take 1 tablet (20 mg) by mouth once daily at bedtime.    omeprazole (PriLOSEC) 20 mg DR capsule; Take 1 capsule (20 mg) by mouth once daily in the morning. Take before meals. Do not crush or chew.    BPH with obstruction/lower urinary tract symptoms  Stable. Continue tamsulosin and finasteride.    Orders:    tamsulosin (Flomax) 0.4 mg 24 hr capsule; Take 1 capsule (0.4 mg) by mouth once daily.    finasteride  (Proscar) 5 mg tablet; Take 1 tablet (5 mg) by mouth once daily.            Your yearly Physical is due in: October 2025  When you call the office for your yearly Physical, please ask them to inform me to order your blood work, so that you can get the fasting blood work before your appointment and we can discuss the results at your physical.      Please call me if any questions arise from now until your next visit. I will call you after I am done seeing patients. A Doctor is always available by phone when the office is closed. Please feel free to call for help with any problem that you feel shouldn't wait until the office re-opens.     I, Rhea Fairbanks MD, attest that this note for 4/17/2025 accurately reflects documentation that my scribe Eduard Joel, made at my direction in my capacity as Rhea Fairbanks MD when I treated Carlos Alberto Seth.    Scribe Attestation  By signing my name below, IEduard Scribe   attest that this documentation has been prepared under the direction and in the presence of Rhea Fairbanks MD.

## 2025-04-17 NOTE — PROGRESS NOTES
Mohs Surgery Operative Note    Date of Surgery:  4/17/2025  Surgeon:  Yobani Ricks MD  Office Location: [address]   [address] [address]  Dept: [phone]  Dept Fax: [phone]  Referring Provider: Tucker Freeman MD  [id number]  [place] [address]      Assessment/Plan   Pre-procedure:   Obtained informed consent: written from patient  The surgical site was identified and confirmed with the patient.     Intra-operative:   Audible time out called at : 12:10 PM 04/17/25  by: Lakisha Stafford RN   Verified patient name, birthdate, site, specimen bottle label & requisition.    The planned procedure(s) was again reviewed with the patient. The risks of bleeding, infection, nerve damage and scarring were reviewed. Written authorization was obtained. The patient identity, surgical site, and planned procedure(s) were verified. The provider acted as both surgeon and pathologist.     BASAL CELL CARCINOMA (BCC) OF SKIN OF LEFT LOWER EXTREMITY INCLUDING HIP  Left Mid Shin  Mohs surgery    Consent obtained: written    Universal Protocol:  Procedure explained and questions answered to patient or proxy's satisfaction: Yes    Test results available and properly labeled: Yes    Pathology report reviewed: Yes    External notes reviewed: Yes    Photo or diagram used for site identification: Yes    Site/side marked: Yes    Slide independently reviewed by Mohs surgeon: Yes    Immediately prior to procedure a time out was called: Yes    Patient identity confirmed: verbally with patient  Preparation: Patient was prepped and draped in usual sterile fashion      Anticoagulation:  Is the patient taking prescription anticoagulant and/or aspirin prescribed/recommended by a physician? No    Was the anticoagulation regimen changed prior to Mohs? No      Anesthesia:  Anesthesia method: local infiltration  Local anesthetic: lidocaine 2% WITH  epi    Procedure Details:  Case ID Number: [id number]  Biopsy accession number: [id number]  Date of biopsy: 2/5/2025  Pre-Op diagnosis: basal cell carcinoma  BCC subtype: infiltrative and nodular  Surgical site (from skin exam): Left Mid Shin  Pre-operative length (cm): 1  Pre-operative width (cm): 0.9  Indications for Mohs surgery: anatomic location where tissue conservation is critical  Previously treated? No      Micrographic Surgery Details:  Post-operative length (cm): 1.6  Post-operative width (cm): 1.5  Number of Mohs stages: 1    Stage 1     Comments: The patient was brought into the operating room and placed in the procedure chair in the appropriate position.  The area positive by previous biopsy was identified and confirmed with the patient. The area of clinically obvious tumor was debulked using a curette and/or scalpel as needed. An incision was made following the Mohs approach through the skin. The specimen was taken to the lab, divided into 2 piece(s) and appropriately chromacoded and processed.       Tumor features identified on Mohs section: no tumor identified    Depth of defect: subcutaneous fat    Patient tolerance of procedure: tolerated well, no immediate complications    Reconstruction:  Was the defect reconstructed?: No     Repair: After a discussion with the patient regarding the options for wound closure, a decision was made to proceed with second intention healing.    Dressing/Follow-up: Surgifoam was placed in the wound. A pressure dressing was placed to help stabilize the wound and to minimize the risk of postoperative bleeding. Wound care was discussed, and the patient was given written post-operative wound care instructions.        Fine/surface layer approximation (top stitches)   Hemostasis achieved with: electrodesiccation  Outcome: patient tolerated procedure well with no complications    Post-procedure details: sterile dressing applied and wound care instructions given    Dressing  type: pressure dressing and Ace wrap          Wound care was discussed, and the patient was given written post-operative wound care instructions.      The patient will follow up with Yobani Ricks MD as needed for any post operative problems or concerns, and will follow up with their primary dermatologist as scheduled.  Office Visit Note  Date: 4/17/2025  Surgeon:  Yobani Ricks MD  Office Location: [address]   [address] [address]  Dept: [phone]  Dept Fax: [phone]  Referring Provider: Tucker Freeman MD  [id number]  Department of Dermatology  [place] [id number]    [facility]   Carlos Alberto Seth is a 89 y.o. male who presents for the following: MOHS Surgery (Left Mid Shin)    According to the patient, the lesion has been present for approximately greater than 1 year at the time of diagnosis.  The lesion is bleeding. The lesion has not been treated previously.    The patient does not have a pacemaker / defibrillator.  The patient does not have a heart valve / joint replacement.    The patient is not on blood thinners.  The patient does not have a history of hepatitis B or C.  The patient does not have a history of HIV.  The patient does not have a history of immunosuppression (e.g. organ transplantation, malignancy, medications)    Review of Systems:  No other skin or systemic complaints other than what is documented elsewhere in the note.    MEDICAL HISTORY: clinically relevant history including significant past medical history, medications and allergies was reviewed and documented in Epic.    Objective   Well appearing patient in no apparent distress; mood and affect are within normal limits.  Vital signs: See record.  Noted on the   Left Mid Shin  Is a 1.0 x 0.9 cm scar    The patient confirmed the identified site.    Discussion:  The nature of the diagnosis was explained. The lesion is a skin cancer.  It has a risk of local growth and  distant spread. The condition is associated with sun exposure.  Warning signs of non-melanoma skin cancer discussed. Patient was instructed to perform monthly self skin examination.  We recommended that the patient have regular full skin exams given an increased risk of subsequent skin cancers. The patient was instructed to use sun protective behaviors including use of broad spectrum sunscreens and sun protective clothing to reduce risk of skin cancers.      Risks, benefits, side effects of Mohs surgery were discussed with patient and the patient voiced understanding.  It was explained that even though the cure rate of Mohs is very high it is not 100%. Risks of surgery including but not limited to bleeding, infection, numbness, nerve damage, and scar were reviewed.  Discussion included wound care requirements, activity restrictions, likely scar outcome and time to heal.     After Mohs surgery, the defect may need to be repaired surgically and the scar may be longer than the original lesion.  Reconstruction options, risks, and benefits were reviewed including second intention healing, linear repair (4-1 ratio was explained), local flaps, skin grafts, cartilage grafts and interpolation flaps (the need for multiple surgeries was explained). Possible outcomes were reviewed including likely scar appearance, failure of flap survival, infection, bleeding and the need for revision surgery.     The pathology was reviewed, the photograph was reviewed, and the referring physician's note was reviewed.    Patient elected for Mohs surgery.      The patient has a basal cell carcinoma.  The pathology was reviewed, the photograph was reviewed, and the referring physicians note was reviewed.  Multiple treatment options including mohs surgery (which has moderate risk of morbidity) were reviewed.    Medical Decision Making  Column 1- Basal Cell Carcinoma (1 acute uncomplicated illness- Low)  Column 2- 3 tests reviewed (pathology,  photograph, refering physician notes- Moderate)  Column 3- Modertate risk of morbidity from additional treatment- mohs surgery- Moderate)    Overall Moderate MDM

## 2025-04-17 NOTE — ASSESSMENT & PLAN NOTE
Continue Pepcid at night.  Take omeprazole 20 mg first thing in the morning on an empty stomach.  No coffee on empty stomach and avoid spicy, citrus fruits, alcohol, cocktails with dinner.  Let me know if symptoms do not improve.   Orders:    famotidine (Pepcid) 20 mg tablet; Take 1 tablet (20 mg) by mouth once daily at bedtime.    omeprazole (PriLOSEC) 20 mg DR capsule; Take 1 capsule (20 mg) by mouth once daily in the morning. Take before meals. Do not crush or chew.

## 2025-04-17 NOTE — ASSESSMENT & PLAN NOTE
Well- controlled. Continue metoprolol and amlodipine.      Orders:    Follow Up In [facility] - PCP - [facility]    metoprolol tartrate (Lopressor) 25 mg tablet; Take 1 tablet (25 mg) by mouth once daily.    amLODIPine (Norvasc) 5 mg tablet; Take 1 tablet (5 mg) by mouth once daily.    Follow Up In [facility] - PCP - Medicare Annual; Future

## 2025-04-18 LAB
ALBUMIN SERPL-MCNC: 3.9 G/DL (ref 3.6–5.1)
ALP SERPL-CCNC: 115 U/L (ref 35–144)
ALT SERPL-CCNC: 14 U/L (ref 9–46)
ANION GAP SERPL CALCULATED.4IONS-SCNC: 10 MMOL/L (CALC) (ref 7–17)
AST SERPL-CCNC: 19 U/L (ref 10–35)
BILIRUB SERPL-MCNC: 0.5 MG/DL (ref 0.2–1.2)
BUN SERPL-MCNC: 16 MG/DL (ref 7–25)
CALCIUM SERPL-MCNC: 9.1 MG/DL (ref 8.6–10.3)
CHLORIDE SERPL-SCNC: 104 MMOL/L (ref 98–110)
CO2 SERPL-SCNC: 25 MMOL/L (ref 20–32)
CREAT SERPL-MCNC: 0.96 MG/DL (ref 0.7–1.22)
EGFRCR SERPLBLD CKD-EPI 2021: 75 ML/MIN/1.73M2
GLUCOSE SERPL-MCNC: 82 MG/DL (ref 65–139)
POTASSIUM SERPL-SCNC: 4.8 MMOL/L (ref 3.5–5.3)
PROT SERPL-MCNC: 6.6 G/DL (ref 6.1–8.1)
SODIUM SERPL-SCNC: 139 MMOL/L (ref 135–146)

## 2025-04-22 ENCOUNTER — APPOINTMENT (OUTPATIENT)
Dept: DERMATOLOGY | Facility: CLINIC | Age: OVER 89
End: 2025-04-22
Payer: MEDICARE

## 2025-09-10 ENCOUNTER — APPOINTMENT (OUTPATIENT)
Dept: DERMATOLOGY | Facility: CLINIC | Age: OVER 89
End: 2025-09-10
Payer: MEDICARE

## 2025-10-20 ENCOUNTER — APPOINTMENT (OUTPATIENT)
Dept: PRIMARY CARE | Facility: CLINIC | Age: OVER 89
End: 2025-10-20
Payer: MEDICARE